# Patient Record
Sex: FEMALE | Race: WHITE | NOT HISPANIC OR LATINO | Employment: OTHER | RURAL
[De-identification: names, ages, dates, MRNs, and addresses within clinical notes are randomized per-mention and may not be internally consistent; named-entity substitution may affect disease eponyms.]

---

## 2022-12-13 ENCOUNTER — OFFICE VISIT (OUTPATIENT)
Dept: FAMILY MEDICINE | Facility: CLINIC | Age: 72
End: 2022-12-13
Payer: MEDICARE

## 2022-12-13 VITALS
WEIGHT: 125 LBS | HEIGHT: 63 IN | DIASTOLIC BLOOD PRESSURE: 79 MMHG | HEART RATE: 61 BPM | SYSTOLIC BLOOD PRESSURE: 160 MMHG | BODY MASS INDEX: 22.15 KG/M2

## 2022-12-13 DIAGNOSIS — Z23 IMMUNIZATION DUE: Primary | ICD-10-CM

## 2022-12-13 DIAGNOSIS — G25.71 DRUG INDUCED AKATHISIA: ICD-10-CM

## 2022-12-13 DIAGNOSIS — E03.9 HYPOTHYROIDISM, UNSPECIFIED TYPE: ICD-10-CM

## 2022-12-13 DIAGNOSIS — I10 ESSENTIAL HYPERTENSION: ICD-10-CM

## 2022-12-13 LAB
ALBUMIN SERPL BCP-MCNC: 4.6 G/DL (ref 3.5–5)
ALBUMIN/GLOB SERPL: 1.4 {RATIO}
ALP SERPL-CCNC: 73 U/L (ref 55–142)
ALT SERPL W P-5'-P-CCNC: 34 U/L (ref 13–56)
ANION GAP SERPL CALCULATED.3IONS-SCNC: 10 MMOL/L (ref 7–16)
AST SERPL W P-5'-P-CCNC: 35 U/L (ref 15–37)
BASOPHILS # BLD AUTO: 0.02 K/UL (ref 0–0.2)
BASOPHILS NFR BLD AUTO: 0.5 % (ref 0–1)
BILIRUB SERPL-MCNC: 0.6 MG/DL (ref ?–1.2)
BUN SERPL-MCNC: 11 MG/DL (ref 7–18)
BUN/CREAT SERPL: 14 (ref 6–20)
CALCIUM SERPL-MCNC: 9.5 MG/DL (ref 8.5–10.1)
CHLORIDE SERPL-SCNC: 104 MMOL/L (ref 98–107)
CO2 SERPL-SCNC: 29 MMOL/L (ref 21–32)
CREAT SERPL-MCNC: 0.81 MG/DL (ref 0.55–1.02)
DIFFERENTIAL METHOD BLD: ABNORMAL
EGFR (NO RACE VARIABLE) (RUSH/TITUS): 77 ML/MIN/1.73M²
EOSINOPHIL # BLD AUTO: 0.08 K/UL (ref 0–0.5)
EOSINOPHIL NFR BLD AUTO: 2.1 % (ref 1–4)
EOSINOPHIL NFR BLD MANUAL: 1 % (ref 1–4)
ERYTHROCYTE [DISTWIDTH] IN BLOOD BY AUTOMATED COUNT: 12.8 % (ref 11.5–14.5)
GLOBULIN SER-MCNC: 3.3 G/DL (ref 2–4)
GLUCOSE SERPL-MCNC: 84 MG/DL (ref 74–106)
HCT VFR BLD AUTO: 42.2 % (ref 38–47)
HGB BLD-MCNC: 13.6 G/DL (ref 12–16)
IMM GRANULOCYTES # BLD AUTO: 0.05 K/UL (ref 0–0.04)
IMM GRANULOCYTES NFR BLD: 1.3 % (ref 0–0.4)
LYMPHOCYTES # BLD AUTO: 2.66 K/UL (ref 1–4.8)
LYMPHOCYTES NFR BLD AUTO: 68.4 % (ref 27–41)
LYMPHOCYTES NFR BLD MANUAL: 62 % (ref 27–41)
MCH RBC QN AUTO: 31.3 PG (ref 27–31)
MCHC RBC AUTO-ENTMCNC: 32.2 G/DL (ref 32–36)
MCV RBC AUTO: 97.2 FL (ref 80–96)
MONOCYTES # BLD AUTO: 0.29 K/UL (ref 0–0.8)
MONOCYTES NFR BLD AUTO: 7.5 % (ref 2–6)
MONOCYTES NFR BLD MANUAL: 9 % (ref 2–6)
MPC BLD CALC-MCNC: 9.9 FL (ref 9.4–12.4)
NEUTROPHILS # BLD AUTO: 0.79 K/UL (ref 1.8–7.7)
NEUTROPHILS NFR BLD AUTO: 20.2 % (ref 53–65)
NEUTS BAND NFR BLD MANUAL: 4 % (ref 1–5)
NEUTS SEG NFR BLD MANUAL: 24 % (ref 50–62)
NRBC # BLD AUTO: 0 X10E3/UL
NRBC, AUTO (.00): 0 %
PLATELET # BLD AUTO: 353 K/UL (ref 150–400)
PLATELET MORPHOLOGY: NORMAL
POTASSIUM SERPL-SCNC: 4.1 MMOL/L (ref 3.5–5.1)
PROT SERPL-MCNC: 7.9 G/DL (ref 6.4–8.2)
RBC # BLD AUTO: 4.34 M/UL (ref 4.2–5.4)
RBC MORPH BLD: NORMAL
SODIUM SERPL-SCNC: 139 MMOL/L (ref 136–145)
TSH SERPL DL<=0.005 MIU/L-ACNC: 0.68 UIU/ML (ref 0.36–3.74)
VIT B12 SERPL-MCNC: 829 PG/ML (ref 193–986)
WBC # BLD AUTO: 3.89 K/UL (ref 4.5–11)

## 2022-12-13 PROCEDURE — 85025 COMPLETE CBC W/AUTO DIFF WBC: CPT | Mod: ,,, | Performed by: CLINICAL MEDICAL LABORATORY

## 2022-12-13 PROCEDURE — 82607 VITAMIN B12: ICD-10-PCS | Mod: ,,, | Performed by: CLINICAL MEDICAL LABORATORY

## 2022-12-13 PROCEDURE — 82607 VITAMIN B-12: CPT | Mod: ,,, | Performed by: CLINICAL MEDICAL LABORATORY

## 2022-12-13 PROCEDURE — 84443 ASSAY THYROID STIM HORMONE: CPT | Mod: ,,, | Performed by: CLINICAL MEDICAL LABORATORY

## 2022-12-13 PROCEDURE — 85025 CBC WITH DIFFERENTIAL: ICD-10-PCS | Mod: ,,, | Performed by: CLINICAL MEDICAL LABORATORY

## 2022-12-13 PROCEDURE — 84443 TSH: ICD-10-PCS | Mod: ,,, | Performed by: CLINICAL MEDICAL LABORATORY

## 2022-12-13 PROCEDURE — 99204 PR OFFICE/OUTPT VISIT, NEW, LEVL IV, 45-59 MIN: ICD-10-PCS | Mod: ,,, | Performed by: FAMILY MEDICINE

## 2022-12-13 PROCEDURE — 99204 OFFICE O/P NEW MOD 45 MIN: CPT | Mod: ,,, | Performed by: FAMILY MEDICINE

## 2022-12-13 PROCEDURE — G0008 FLU VACCINE - QUADRIVALENT - ADJUVANTED: ICD-10-PCS | Mod: ,,, | Performed by: FAMILY MEDICINE

## 2022-12-13 PROCEDURE — 90694 VACC AIIV4 NO PRSRV 0.5ML IM: CPT | Mod: ,,, | Performed by: FAMILY MEDICINE

## 2022-12-13 PROCEDURE — G0008 ADMIN INFLUENZA VIRUS VAC: HCPCS | Mod: ,,, | Performed by: FAMILY MEDICINE

## 2022-12-13 PROCEDURE — 80053 COMPREHENSIVE METABOLIC PANEL: ICD-10-PCS | Mod: ,,, | Performed by: CLINICAL MEDICAL LABORATORY

## 2022-12-13 PROCEDURE — 80053 COMPREHEN METABOLIC PANEL: CPT | Mod: ,,, | Performed by: CLINICAL MEDICAL LABORATORY

## 2022-12-13 PROCEDURE — 90694 FLU VACCINE - QUADRIVALENT - ADJUVANTED: ICD-10-PCS | Mod: ,,, | Performed by: FAMILY MEDICINE

## 2022-12-13 RX ORDER — CITALOPRAM 10 MG/1
10 TABLET ORAL DAILY
COMMUNITY
End: 2022-12-13 | Stop reason: SDUPTHER

## 2022-12-13 RX ORDER — METRONIDAZOLE 7.5 MG/G
CREAM TOPICAL DAILY
COMMUNITY
End: 2023-06-28

## 2022-12-13 RX ORDER — LISINOPRIL AND HYDROCHLOROTHIAZIDE 12.5; 2 MG/1; MG/1
1 TABLET ORAL DAILY
Qty: 90 TABLET | Refills: 0 | Status: CANCELLED | OUTPATIENT
Start: 2022-12-13

## 2022-12-13 RX ORDER — CITALOPRAM 10 MG/1
10 TABLET ORAL DAILY
Qty: 90 TABLET | Refills: 0 | Status: SHIPPED | OUTPATIENT
Start: 2022-12-13 | End: 2023-03-08

## 2022-12-13 RX ORDER — CARVEDILOL 3.12 MG/1
3.12 TABLET ORAL 2 TIMES DAILY
COMMUNITY
End: 2022-12-13 | Stop reason: SDUPTHER

## 2022-12-13 RX ORDER — LISINOPRIL AND HYDROCHLOROTHIAZIDE 12.5; 2 MG/1; MG/1
1 TABLET ORAL DAILY
COMMUNITY
End: 2022-12-13

## 2022-12-13 RX ORDER — CARVEDILOL 3.12 MG/1
3.12 TABLET ORAL 2 TIMES DAILY
Qty: 180 TABLET | Refills: 0 | Status: SHIPPED | OUTPATIENT
Start: 2022-12-13 | End: 2023-12-05

## 2022-12-13 RX ORDER — LOSARTAN POTASSIUM AND HYDROCHLOROTHIAZIDE 12.5; 1 MG/1; MG/1
1 TABLET ORAL DAILY
Qty: 90 TABLET | Refills: 3 | Status: SHIPPED | OUTPATIENT
Start: 2022-12-13 | End: 2023-12-05

## 2022-12-13 NOTE — PROGRESS NOTES
Star Galaviz IV, DO  [facility] [place]  [address], [place] [address]  Phone: [phone]      Subjective     Name: Casi Broussard   Sex: female  YOB: 1950 (72 y.o.)  MRN: [id number]  Visit Date: 12/13/2022   Chief Complaint: Establish [place]        HISTORY OF PRESENT ILLNESS:    Patient presents clinic today to establish care.  She has a medical history of hypertension, anxiety, and abnormal heart rhythm.  She has brought in a list of labs that her daughter thinks that she get.  Her daughter is a nurse practitioner have reviewed this list requests.  I agree with many things on the sheet and happy to order them.  In either Medicaid relevant I will be ordering today.  Baseline labs will include CBC, CMP, TSH, B12.  Patient is amenable to getting her influenza shot today.  Lastly patient systolic blood pressure was 160 in office today she is currently on lisinopril 20 hydrochlorothiazide 12.5 I will be changing this to losartan 100 with hydrochlorothiazide 12.5 today.  Patient return clinic in no more than 3 months for further evaluation.  All questions answered all concerns addressed.      This document was dictated using mmodal fluency direct.  It is subject to dictation errors.    PAST MEDICAL HISTORY:  Significant Diagnoses - Patient  has a past medical history of Anxiety, Depression, and Hypertension.  Medications - Patient has a current medication list which includes the following long-term medication(s): carvedilol, citalopram, losartan-hydrochlorothiazide 100-12.5 mg, and metronidazole 0.75%.   Allergies - Patient is allergic to codeine.  Surgeries - Patient  has no past surgical history on file.  Family History - Patient family history is not on file.      SOCIAL HISTORY:  Tobacco - Patient  reports that she has never smoked. She has never used smokeless tobacco.   Alcohol - Patient  reports current alcohol use.  "  Recreational Drugs - Patient  has no history on file for drug use.       Review of Systems   Constitutional:  Negative for fatigue and fever.   HENT:  Negative for nasal congestion and sore throat.    Respiratory:  Negative for cough and shortness of breath.    Cardiovascular:  Negative for chest pain.   Gastrointestinal:  Negative for abdominal pain, nausea and vomiting.   Genitourinary:  Negative for dysuria.   Musculoskeletal:  Negative for myalgias.   Integumentary:  Negative for rash.   Neurological:  Negative for dizziness, weakness and headaches.        Past Medical History:   Diagnosis Date    Anxiety     Depression     Hypertension         Review of patient's allergies indicates:   Allergen Reactions    Codeine         History reviewed. No pertinent surgical history.     History reviewed. No pertinent family history.    Current Outpatient Medications   Medication Instructions    carvediloL (COREG) 3.125 mg, Oral, 2 times daily    citalopram (CELEXA) 10 mg, Oral, Daily    losartan-hydrochlorothiazide 100-12.5 mg (HYZAAR) 100-12.5 mg Tab 1 tablet, Oral, Daily    metronidazole 0.75% (METROCREAM) 0.75 % Crea Topical (Top), Daily        Objective     BP (!) 160/79   Pulse 61   Ht 5' 3" (1.6 m)   Wt 56.7 kg (125 lb)   BMI 22.14 kg/m²     Physical Exam  Constitutional:       General: She is not in acute distress.     Appearance: Normal appearance. She is not ill-appearing.   HENT:      Head: Normocephalic and atraumatic.      Right Ear: External ear normal.      Left Ear: External ear normal.   Eyes:      Extraocular Movements: Extraocular movements intact.      Conjunctiva/sclera: Conjunctivae normal.   Cardiovascular:      Rate and Rhythm: Normal rate.      Heart sounds: No murmur heard.  Pulmonary:      Effort: Pulmonary effort is normal.   Musculoskeletal:      Cervical back: Normal range of motion.   Skin:     General: Skin is warm and dry.      Coloration: Skin is not jaundiced.      Findings: " No rash.   Neurological:      Mental Status: She is alert.   Psychiatric:         Mood and Affect: Mood normal.        All recently obtained labs have been reviewed and discussed in detail with the patient.   Assessment     1. Immunization due    2. Essential hypertension    3. Hypothyroidism, unspecified type    4. Drug induced akathisia         Plan        Problem List Items Addressed This Visit    None  Visit Diagnoses       Immunization due    -  Primary    Relevant Orders    Influenza (FLUAD) - Quadrivalent (Adjuvanted) *Preferred* (65+) (PF) (Completed)    Essential hypertension        Relevant Medications    carvediloL (COREG) 3.125 MG tablet    citalopram (CELEXA) 10 MG tablet    losartan-hydrochlorothiazide 100-12.5 mg (HYZAAR) 100-12.5 mg Tab    Other Relevant Orders    Comprehensive Metabolic Panel    CBC Auto Differential    Vitamin B12    Hypothyroidism, unspecified type        Relevant Orders    TSH    Drug induced akathisia        Relevant Orders    Vitamin B12            No follow-ups on file.    Patient advised that is symptoms worsen, they should call or report directly to local emergency department.    Star Galaviz,   [facility]

## 2023-01-09 DIAGNOSIS — Z71.89 COMPLEX CARE COORDINATION: ICD-10-CM

## 2023-01-26 ENCOUNTER — OFFICE VISIT (OUTPATIENT)
Dept: FAMILY MEDICINE | Facility: CLINIC | Age: 73
End: 2023-01-26
Payer: MEDICARE

## 2023-01-26 VITALS
DIASTOLIC BLOOD PRESSURE: 69 MMHG | WEIGHT: 125 LBS | HEIGHT: 63 IN | HEART RATE: 61 BPM | BODY MASS INDEX: 22.15 KG/M2 | SYSTOLIC BLOOD PRESSURE: 132 MMHG

## 2023-01-26 DIAGNOSIS — I10 ESSENTIAL HYPERTENSION: Chronic | ICD-10-CM

## 2023-01-26 DIAGNOSIS — M54.32 SCIATICA OF LEFT SIDE: Primary | ICD-10-CM

## 2023-01-26 DIAGNOSIS — M62.830 MUSCLE SPASM OF BACK: ICD-10-CM

## 2023-01-26 PROCEDURE — 96372 THER/PROPH/DIAG INJ SC/IM: CPT | Mod: ,,, | Performed by: FAMILY MEDICINE

## 2023-01-26 PROCEDURE — 99213 PR OFFICE/OUTPT VISIT, EST, LEVL III, 20-29 MIN: ICD-10-PCS | Mod: ,,, | Performed by: FAMILY MEDICINE

## 2023-01-26 PROCEDURE — 96372 PR INJECTION,THERAP/PROPH/DIAG2ST, IM OR SUBCUT: ICD-10-PCS | Mod: ,,, | Performed by: FAMILY MEDICINE

## 2023-01-26 PROCEDURE — 99213 OFFICE O/P EST LOW 20 MIN: CPT | Mod: ,,, | Performed by: FAMILY MEDICINE

## 2023-01-26 RX ORDER — PREDNISONE 20 MG/1
20 TABLET ORAL DAILY
Qty: 10 TABLET | Refills: 0 | Status: SHIPPED | OUTPATIENT
Start: 2023-01-26 | End: 2023-03-06

## 2023-01-26 RX ORDER — METHOCARBAMOL 500 MG/1
TABLET, FILM COATED ORAL
Qty: 40 TABLET | Refills: 0 | Status: SHIPPED | OUTPATIENT
Start: 2023-01-26 | End: 2023-06-28

## 2023-01-26 RX ORDER — KETOROLAC TROMETHAMINE 30 MG/ML
60 INJECTION, SOLUTION INTRAMUSCULAR; INTRAVENOUS
Status: COMPLETED | OUTPATIENT
Start: 2023-01-26 | End: 2023-01-26

## 2023-01-26 RX ADMIN — KETOROLAC TROMETHAMINE 60 MG: 30 INJECTION, SOLUTION INTRAMUSCULAR; INTRAVENOUS at 04:01

## 2023-01-26 NOTE — PROGRESS NOTES
Tio Davila MD   [facility]  [facility]  [address] [place] [address] [place] [address]  [phone]      PATIENT NAME: Casi Broussard  : 1950  DATE: 23  MRN: [id number]      Billing Provider: Tio Davila MD  Level of Service:   Patient PCP Information       Provider PCP Type    Tio Davila MD General            Reason for Visit / Chief Complaint: Back Pain       Update PCP  Update Chief Complaint         History of Present Illness / Problem Focused Workflow     Casi Broussard presents to the clinic with Back Pain       71 yo WF with 2 week history of LBP on left that radiates into LLE.  She denies any h/o back problems or sciatica.  Cannot identify any activity that may instigated her pain.  Denies any fall or injury.  She rates her pain as 8/10 and says that it is excruciating at times.  Is usually worse in mornings when she wakes up.  Has been taking tylenol and says that it does help some to reduce her pain.  Is made worse with certain movements.  Denies any numbness or tingling and does not report any bowel or bladder incontinence.      Review of Systems     Review of Systems   Constitutional:  Negative for chills and fever.   Musculoskeletal:  Positive for back pain, leg pain and myalgias.   Neurological:  Negative for weakness and numbness.      Medical / Social / Family History     Past Medical History:   Diagnosis Date    Anxiety     Depression     Hypertension        History reviewed. No pertinent surgical history.    Social History    reports that she has never smoked. She has never used smokeless tobacco. She reports current alcohol use.   Social History     Tobacco Use    Smoking status: Never    Smokeless tobacco: Never   Substance Use Topics    Alcohol use: Yes       Family History  History reviewed. No pertinent family history.    Medications and Allergies     Medications  Outpatient Medications Marked as Taking  for the 1/26/23 encounter (Office Visit) with Tio Davila MD   Medication Sig Dispense Refill    carvediloL (COREG) 3.125 MG tablet Take 1 tablet (3.125 mg total) by mouth 2 (two) times daily. 180 tablet 0    citalopram (CELEXA) 10 MG tablet Take 1 tablet (10 mg total) by mouth once daily. 90 tablet 0    losartan-hydrochlorothiazide 100-12.5 mg (HYZAAR) 100-12.5 mg Tab Take 1 tablet by mouth once daily. 90 tablet 3    metronidazole 0.75% (METROCREAM) 0.75 % Crea Apply topically once daily.       Current Facility-Administered Medications for the 1/26/23 encounter (Office Visit) with Tio Davila MD   Medication Dose Route Frequency Provider Last Rate Last Admin    [COMPLETED] ketorolac injection 60 mg  60 mg Intramuscular 1 time in Clinic/HOD Tio Davila MD   60 mg at 01/26/23 1651       Allergies  Review of patient's allergies indicates:   Allergen Reactions    Codeine        Physical Examination     Vitals:    01/26/23 1703   BP: 132/69   Pulse: 61     Physical Exam  Vitals reviewed.   Constitutional:       Appearance: Normal appearance.   HENT:      Head: Normocephalic and atraumatic.   Eyes:      Extraocular Movements: Extraocular movements intact.      Conjunctiva/sclera: Conjunctivae normal.      Pupils: Pupils are equal, round, and reactive to light.   Cardiovascular:      Rate and Rhythm: Normal rate and regular rhythm.      Heart sounds: Normal heart sounds.   Pulmonary:      Effort: Pulmonary effort is normal.      Breath sounds: Normal breath sounds.   Musculoskeletal:      Cervical back: Normal range of motion.      Comments: Decreased ROM lumbar spine; she has increased pain with flexion and rotation to left; pain unchanged with extension and rotation to right; she is able to toe walk and heel walk without difficulty   Skin:     General: Skin is warm and dry.   Neurological:      General: No focal deficit present.      Mental Status: She is alert and oriented to person, place, and time.       Motor: No weakness.      Comments: Mildly positive SLR on left   Psychiatric:         Mood and Affect: Mood normal.         Behavior: Behavior normal.        Assessment and Plan (including Health Maintenance)      Problem List  Smart Sets  Document Outside HM   :    Plan: as per handouts given to patient and discussed in clinic.  Take meds as prescribed.  RTC if no better in 1-2 weeks and will get xray at that time.  Initial BP mildly elevated but patient in pain.  Recheck BP normotensive.            Health Maintenance Due   Topic Date Due    Hepatitis C Screening  Never done    Lipid Panel  Never done    COVID-19 Vaccine (1) Never done    TETANUS VACCINE  Never done    Mammogram  Never done    DEXA Scan  Never done    Colorectal Cancer Screening  Never done    Shingles Vaccine (1 of 2) Never done       Problem List Items Addressed This Visit          Cardiac/Vascular    Essential hypertension     Other Visit Diagnoses       Sciatica of left side    -  Primary    Relevant Medications    ketorolac injection 60 mg (Completed)    predniSONE (DELTASONE) 20 MG tablet    methocarbamoL (ROBAXIN) 500 MG Tab    Muscle spasm of back        Relevant Medications    methocarbamoL (ROBAXIN) 500 MG Tab            The patient has no Health Maintenance topics of status Not Due    No future appointments.         Signature:  MD [facility]  [facility]  [address] [place] [address] [place] [address]  [phone]    Date of encounter: 1/26/23

## 2023-03-06 ENCOUNTER — OFFICE VISIT (OUTPATIENT)
Dept: FAMILY MEDICINE | Facility: CLINIC | Age: 73
End: 2023-03-06
Payer: MEDICARE

## 2023-03-06 ENCOUNTER — HOSPITAL ENCOUNTER (OUTPATIENT)
Dept: RADIOLOGY | Facility: HOSPITAL | Age: 73
Discharge: HOME | End: 2023-03-06
Attending: FAMILY MEDICINE
Payer: MEDICARE

## 2023-03-06 VITALS
HEART RATE: 68 BPM | BODY MASS INDEX: 22.32 KG/M2 | HEIGHT: 63 IN | SYSTOLIC BLOOD PRESSURE: 150 MMHG | DIASTOLIC BLOOD PRESSURE: 73 MMHG | WEIGHT: 126 LBS

## 2023-03-06 DIAGNOSIS — R41.3 MEMORY LOSS: Primary | ICD-10-CM

## 2023-03-06 DIAGNOSIS — I10 ESSENTIAL HYPERTENSION: ICD-10-CM

## 2023-03-06 DIAGNOSIS — R40.4 TRANSIENT ALTERATION OF AWARENESS: ICD-10-CM

## 2023-03-06 DIAGNOSIS — R41.3 OTHER AMNESIA: ICD-10-CM

## 2023-03-06 PROCEDURE — 99214 PR OFFICE/OUTPT VISIT, EST, LEVL IV, 30-39 MIN: ICD-10-PCS | Mod: ,,, | Performed by: FAMILY MEDICINE

## 2023-03-06 PROCEDURE — 70450 CT HEAD/BRAIN W/O DYE: CPT | Mod: TC

## 2023-03-06 PROCEDURE — 99214 OFFICE O/P EST MOD 30 MIN: CPT | Mod: ,,, | Performed by: FAMILY MEDICINE

## 2023-03-06 RX ORDER — MEMANTINE HYDROCHLORIDE 5 MG/1
5 TABLET ORAL 2 TIMES DAILY
Qty: 60 TABLET | Refills: 11 | Status: SHIPPED | OUTPATIENT
Start: 2023-03-06 | End: 2024-02-07

## 2023-03-06 RX ORDER — AMLODIPINE BESYLATE 5 MG/1
5 TABLET ORAL DAILY
Qty: 30 TABLET | Refills: 11 | Status: SHIPPED | OUTPATIENT
Start: 2023-03-06 | End: 2024-02-07

## 2023-03-06 NOTE — PROGRESS NOTES
"              Star Galaviz IV, DO  [facility] [place]  [address], [place] [address]  Phone: [phone]      Subjective     Name: Casi Broussard   Sex: female  YOB: 1950 (72 y.o.)  MRN: [id number]  Visit Date: 03/06/2023   Chief Complaint: Memory Loss (Pt is here with her sister who is an NP. Pt has memory loss that has worsened I the last year since moving back to [place])        HISTORY OF PRESENT ILLNESS:    Chief Complaint   Patient presents with    Memory Loss     Pt is here with her sister who is an NP. Pt has memory loss that has worsened I the last year since moving back to [place]       HPI  See tests that keep you healthy and the problem oriented documentation below.    Tests to Keep You Healthy    Mammogram: DUE  Colon Cancer Screening: DUE  Last Blood Pressure <= 139/89 (3/6/2023): NO        Portions of this note may have been created with voice recognition software. Occasional "wrong-word" or "sound-a-like" substitutions may have occurred due to the inherent limitations of voice recognition software. Please, read the note carefully and recognize, using context, where substitutions have occurred.     PAST MEDICAL HISTORY:  Significant Diagnoses - Patient  has a past medical history of Anxiety, Depression, and Hypertension.  Medications - Patient has a current medication list which includes the following long-term medication(s): carvedilol, citalopram, losartan-hydrochlorothiazide 100-12.5 mg, metronidazole 0.75%, amlodipine, and memantine.   Allergies - Patient is allergic to codeine.  Surgeries - Patient  has no past surgical history on file.  Family History - Patient family history is not on file.      SOCIAL HISTORY:  Tobacco - Patient  reports that she has never smoked. She has never used smokeless tobacco.   Alcohol - Patient  reports current alcohol use.   Recreational Drugs - Patient  has no history on file for drug use.       Review of Systems " "  Constitutional:  Negative for fatigue and fever.   HENT:  Negative for nasal congestion and sore throat.    Respiratory:  Negative for cough and shortness of breath.    Cardiovascular:  Negative for chest pain.   Gastrointestinal:  Negative for abdominal pain, nausea and vomiting.   Genitourinary:  Negative for dysuria.   Musculoskeletal:  Negative for myalgias.   Integumentary:  Negative for rash.   Neurological:  Negative for dizziness, weakness and headaches.        Past Medical History:   Diagnosis Date    Anxiety     Depression     Hypertension         Review of patient's allergies indicates:   Allergen Reactions    Codeine         No past surgical history on file.     No family history on file.    Current Outpatient Medications   Medication Instructions    amLODIPine (NORVASC) 5 mg, Oral, Daily    carvediloL (COREG) 3.125 mg, Oral, 2 times daily    citalopram (CELEXA) 10 mg, Oral, Daily    losartan-hydrochlorothiazide 100-12.5 mg (HYZAAR) 100-12.5 mg Tab 1 tablet, Oral, Daily    memantine (NAMENDA) 5 mg, Oral, 2 times daily    methocarbamoL (ROBAXIN) 500 MG Tab 1-2 tablets PO QID prn back spasm    metronidazole 0.75% (METROCREAM) 0.75 % Crea Topical (Top), Daily        Objective     BP (!) 150/73   Pulse 68   Ht 5' 3" (1.6 m)   Wt 57.2 kg (126 lb)   BMI 22.32 kg/m²     Physical Exam  Constitutional:       General: She is not in acute distress.     Appearance: Normal appearance. She is not ill-appearing.   HENT:      Head: Normocephalic and atraumatic.      Right Ear: External ear normal.      Left Ear: External ear normal.   Eyes:      Extraocular Movements: Extraocular movements intact.      Conjunctiva/sclera: Conjunctivae normal.   Cardiovascular:      Rate and Rhythm: Normal rate.      Heart sounds: No murmur heard.  Pulmonary:      Effort: Pulmonary effort is normal.   Musculoskeletal:      Cervical back: Normal range of motion.   Skin:     General: Skin is warm and dry.      Coloration: " Skin is not jaundiced.      Findings: No rash.   Neurological:      Mental Status: She is alert.   Psychiatric:         Mood and Affect: Mood normal.        All recently obtained labs have been reviewed and discussed in detail with the patient.   Assessment     1. Memory loss    2. Transient alteration of awareness    3. Other amnesia    4. Essential hypertension         Plan        Problem List Items Addressed This Visit          Neuro    Memory loss - Primary     Me cog score 5 today.  Patient has been having some trouble with short-term memory loss.  Long-term memory loss appears to be intact and my limited evaluation.  We will be starting Namenda 5 mg p.o. b.i.d. through medical decision-making and family preference         Relevant Medications    memantine (NAMENDA) 5 MG Tab    Transient alteration of awareness     Patient has transiently became whom less less aware of current situation.  We will be evaluating with CT without of the head.  Anticipate degenerative changes with microvascular disease.            Cardiac/Vascular    Essential hypertension     Patient is hypertensive in the office today at 150/73.  She is currently on Coreg 3.125 mg p.o. b.i.d. as well as losartan hydrochlorothiazide 100/12.5 mg p.o. q.d.  Patient would likely benefit from calcium channel blocker and the we will start Norvasc 5 mg p.o. q.d..  Patient was warned about possible side effects including leg swelling and monitor treatment.  If we need to we can always go up on this medication with the leg swelling is dose dependent.         Relevant Medications    amLODIPine (NORVASC) 5 MG tablet     Other Visit Diagnoses       Other amnesia        Relevant Orders    CT Head Without Contrast            No follow-ups on file.    Patient advised that is symptoms worsen, they should call or report directly to local emergency department.    Star Galaviz,   [facility]

## 2023-03-06 NOTE — ASSESSMENT & PLAN NOTE
Me cog score 5 today.  Patient has been having some trouble with short-term memory loss.  Long-term memory loss appears to be intact and my limited evaluation.  We will be starting Namenda 5 mg p.o. b.i.d. through medical decision-making and family preference

## 2023-03-06 NOTE — ASSESSMENT & PLAN NOTE
Patient is hypertensive in the office today at 150/73.  She is currently on Coreg 3.125 mg p.o. b.i.d. as well as losartan hydrochlorothiazide 100/12.5 mg p.o. q.d.  Patient would likely benefit from calcium channel blocker and the we will start Norvasc 5 mg p.o. q.d..  Patient was warned about possible side effects including leg swelling and monitor treatment.  If we need to we can always go up on this medication with the leg swelling is dose dependent.

## 2023-03-06 NOTE — ASSESSMENT & PLAN NOTE
Patient has transiently became whom less less aware of current situation.  We will be evaluating with CT without of the head.  Anticipate degenerative changes with microvascular disease.

## 2023-06-28 ENCOUNTER — APPOINTMENT (OUTPATIENT)
Dept: RADIOLOGY | Facility: CLINIC | Age: 73
End: 2023-06-28
Attending: FAMILY MEDICINE
Payer: MEDICARE

## 2023-06-28 ENCOUNTER — OFFICE VISIT (OUTPATIENT)
Dept: FAMILY MEDICINE | Facility: CLINIC | Age: 73
End: 2023-06-28
Payer: MEDICARE

## 2023-06-28 VITALS
WEIGHT: 126 LBS | SYSTOLIC BLOOD PRESSURE: 104 MMHG | HEART RATE: 77 BPM | DIASTOLIC BLOOD PRESSURE: 65 MMHG | BODY MASS INDEX: 22.32 KG/M2 | HEIGHT: 63 IN

## 2023-06-28 DIAGNOSIS — M79.672 LEFT FOOT PAIN: ICD-10-CM

## 2023-06-28 DIAGNOSIS — F03.90 DEMENTIA, UNSPECIFIED DEMENTIA SEVERITY, UNSPECIFIED DEMENTIA TYPE, UNSPECIFIED WHETHER BEHAVIORAL, PSYCHOTIC, OR MOOD DISTURBANCE OR ANXIETY: ICD-10-CM

## 2023-06-28 DIAGNOSIS — M79.672 LEFT FOOT PAIN: Primary | ICD-10-CM

## 2023-06-28 PROCEDURE — 99213 PR OFFICE/OUTPT VISIT, EST, LEVL III, 20-29 MIN: ICD-10-PCS | Mod: ,,, | Performed by: FAMILY MEDICINE

## 2023-06-28 PROCEDURE — 99213 OFFICE O/P EST LOW 20 MIN: CPT | Mod: ,,, | Performed by: FAMILY MEDICINE

## 2023-06-28 PROCEDURE — 73630 X-RAY EXAM OF FOOT: CPT | Mod: TC,RHCUB,FY,LT | Performed by: FAMILY MEDICINE

## 2023-06-28 RX ORDER — ASPIRIN 81 MG/1
81 TABLET ORAL DAILY
COMMUNITY

## 2023-07-03 PROBLEM — F03.90 DEMENTIA, UNSPECIFIED DEMENTIA SEVERITY, UNSPECIFIED DEMENTIA TYPE, UNSPECIFIED WHETHER BEHAVIORAL, PSYCHOTIC, OR MOOD DISTURBANCE OR ANXIETY: Status: ACTIVE | Noted: 2023-07-03

## 2023-07-03 NOTE — PROGRESS NOTES
"              Star Galaviz IV, DO  [facility] [place]  [address], [place] [address]  Phone: [phone]      Subjective     Name: Casi Broussard   Sex: female  YOB: 1950 (73 y.o.)  MRN: [id number]  Visit Date: 07/03/2023   Chief Complaint: left foot pain (X 2 weeks- pain, swelling/Saw dr in [place]- 6/20/23  xrays ok, uric acid level normal)        HISTORY OF PRESENT ILLNESS:    Chief Complaint   Patient presents with    left foot pain     X 2 weeks- pain, swelling  [facility]  xrays ok, uric acid level normal       HPI  See tests that keep you healthy and the problem oriented documentation below.    Tests to Keep You Healthy    Mammogram: DUE  Colon Cancer Screening: DUE  Last Blood Pressure <= 139/89 (6/28/2023): Yes      Portions of this note may have been created with voice recognition software. Occasional "wrong-word" or "sound-a-like" substitutions may have occurred due to the inherent limitations of voice recognition software. Please, read the note carefully and recognize, using context, where substitutions have occurred.     PAST MEDICAL HISTORY:  Significant Diagnoses - Patient  has a past medical history of Anxiety, Depression, and Hypertension.  Medications - Patient has a current medication list which includes the following long-term medication(s): amlodipine, aspirin, carvedilol, citalopram, losartan-hydrochlorothiazide 100-12.5 mg, and memantine.   Allergies - Patient is allergic to codeine.  Surgeries - Patient  has no past surgical history on file.  Family History - Patient family history is not on file.      SOCIAL HISTORY:  Tobacco - Patient  reports that she has never smoked. She has never used smokeless tobacco.   Alcohol - Patient  reports current alcohol use.   Recreational Drugs - Patient  has no history on file for drug use.       Review of Systems   All other systems reviewed and are negative.       Past Medical History:   Diagnosis " "Date    Anxiety     Depression     Hypertension         Review of patient's allergies indicates:   Allergen Reactions    Codeine         History reviewed. No pertinent surgical history.     History reviewed. No pertinent family history.    Current Outpatient Medications   Medication Instructions    amLODIPine (NORVASC) 5 mg, Oral, Daily    aspirin (ECOTRIN) 81 mg, Oral, Daily    carvediloL (COREG) 3.125 mg, Oral, 2 times daily    citalopram (CELEXA) 10 MG tablet Take 1 tablet by mouth once daily    losartan-hydrochlorothiazide 100-12.5 mg (HYZAAR) 100-12.5 mg Tab 1 tablet, Oral, Daily    memantine (NAMENDA) 5 mg, Oral, 2 times daily        Objective     /65   Pulse 77   Ht 5' 3" (1.6 m)   Wt 57.2 kg (126 lb)   BMI 22.32 kg/m²     Physical Exam  Constitutional:       General: She is not in acute distress.     Appearance: Normal appearance. She is not ill-appearing.   HENT:      Head: Normocephalic and atraumatic.      Right Ear: External ear normal.      Left Ear: External ear normal.   Eyes:      Extraocular Movements: Extraocular movements intact.      Conjunctiva/sclera: Conjunctivae normal.   Cardiovascular:      Rate and Rhythm: Normal rate.      Heart sounds: No murmur heard.  Pulmonary:      Effort: Pulmonary effort is normal.   Musculoskeletal:      Cervical back: Normal range of motion.   Skin:     General: Skin is warm and dry.      Coloration: Skin is not jaundiced.      Findings: No rash.   Neurological:      Mental Status: She is alert.   Psychiatric:         Mood and Affect: Mood normal.        All recently obtained labs have been reviewed and discussed in detail with the patient.   Assessment     1. Left foot pain    2. Dementia, unspecified dementia severity, unspecified dementia type, unspecified whether behavioral, psychotic, or mood disturbance or anxiety         Plan        Problem List Items Addressed This Visit          Other    Dementia, unspecified dementia severity, " unspecified dementia type, unspecified whether behavioral, psychotic, or mood disturbance or anxiety     Other Visit Diagnoses       Left foot pain    -  Primary    Relevant Orders    X-Ray Foot Complete 3 view Left (Completed)            No follow-ups on file.    Patient advised that is symptoms worsen, they should call or report directly to local emergency department.    Star Galaviz,   [facility]

## 2023-07-10 DIAGNOSIS — M79.672 LEFT FOOT PAIN: Primary | ICD-10-CM

## 2023-07-10 DIAGNOSIS — M25.572 LEFT ANKLE PAIN, UNSPECIFIED CHRONICITY: ICD-10-CM

## 2023-07-18 DIAGNOSIS — I10 ESSENTIAL HYPERTENSION: ICD-10-CM

## 2023-07-19 ENCOUNTER — CLINICAL SUPPORT (OUTPATIENT)
Dept: REHABILITATION | Facility: HOSPITAL | Age: 73
End: 2023-07-19
Payer: MEDICARE

## 2023-07-19 DIAGNOSIS — M25.572 LEFT ANKLE PAIN, UNSPECIFIED CHRONICITY: ICD-10-CM

## 2023-07-19 DIAGNOSIS — M79.672 LEFT FOOT PAIN: Primary | ICD-10-CM

## 2023-07-19 PROCEDURE — 97162 PT EVAL MOD COMPLEX 30 MIN: CPT

## 2023-07-19 PROCEDURE — 97035 APP MDLTY 1+ULTRASOUND EA 15: CPT

## 2023-07-19 RX ORDER — CITALOPRAM 10 MG/1
TABLET ORAL
Qty: 90 TABLET | Refills: 0 | Status: SHIPPED | OUTPATIENT
Start: 2023-07-19 | End: 2023-10-16

## 2023-07-19 NOTE — PLAN OF CARE
" [facility]  Physical Therapy Initial Evaluation    Name: Casi Broussard  Clinic Number: [id number]    Therapy Diagnosis:   Encounter Diagnoses   Name Primary?    Left foot pain Yes    Left ankle pain, unspecified chronicity      Physician: Star Galaviz IV, DO    Physician Orders: PT Eval and Treat  Medical Diagnosis from Referral: M79.672 (ICD-10-CM) - Left foot pain and M25.572 (ICD-10-CM) - Left ankle pain, unspecified chronicity  Evaluation Date: 7/19/2023  Authorization Period Expiration: 7/9/2024  Plan of Care Expiration: 8/25/2023  Visit # / Visits authorized: 1/9 (including initial evaluation)    Time In: 1110  Time Out: 1150  Total Appointment Time (timed & untimed codes): 40 minutes    Precautions: Standard    Subjective     Date of onset: ~3 weeks ago    History of current condition - Casi reports left dorsal foot pain that began ~3 weeks ago. Patient reports she does not know of a traumatic injury that caused the pain. Patient reports she has had 2 x-rays of her left foot, neither of which were conclusive on a source of pain. Patient reports intermittent pain in the left foot when she is not weightbearing and constant aching when walking.     Falls: none    Imaging: X-ray of left foot from 6/28/2023: "Mild-to-moderate hallux valgus deformity.  No acute fracture."    Prior Therapy: none for presenting condition  Social History: lives with their spouse  Steps/Stairs: 3 steps with a unilateral handrail when entering  Occupation: retired  Driving: No  Durable Medical Equipment: none  Dominant Extremity: right  Affected Extremity: left  Prior Level of Function: independent with all functional mobility without assistive device  Current Level of Function: independent with all functional mobility without assistive device but with a noted limp due to antalgic gait on the left    Pain:  Current 4/10, worst 8/10, best 0/10   Location: dorsal surface of left " foot  Description: Aching  Aggravating Factors: standing/walking  Easing Factors: pain medication, ice, and rest    Pts goals: Patient wishes to decrease the pain in her left foot    Medical History:   Past Medical History:   Diagnosis Date    Anxiety     Depression     Hypertension      Surgical History:   Casi Broussard  has no past surgical history on file.    Medications:   Casi has a current medication list which includes the following prescription(s): amlodipine, aspirin, carvedilol, citalopram, losartan-hydrochlorothiazide 100-12.5 mg, and memantine.    Allergies:   Review of patient's allergies indicates:   Allergen Reactions    Codeine      Objective     Range of motion:   Right Left    Knee extension WFL WFL   Knee flexion WFL WFL   Ankle dorsiflexion +3* +3*   Ankle plantarflexion 58* 40*   Ankle inversion 18* 20*   Ankle eversion 10* 20*     Manual muscle testing:   Right  Left    Knee extension  MMT strength: 5/5  MMT strength: 5/5   Knee flexion  MMT strength: 5/5  MMT strength: 5/5   Ankle dorsiflexion  MMT strength: 5/5  MMT strength: 4/5   Ankle plantarflexion  MMT strength: 5/5  MMT strength: 5/5   Ankle inversion  MMT strength: 5/5  MMT strength: 5/5   Ankle eversion  MMT strength: 5/5  MMT strength: 4-/5     Clinical Special Tests:  Anterior drawer test: right Negative left Negative  Inversion stress test: right Negative left Negative  Eversion stress test: right Negative left Negative    Incision: N/A    Gait:  Weight bearing precautions: WBAT  Assistive device: none  Ambulation distance and deviations: short community distances; decreased step lengths bilaterally; decreased heel strike bilaterally; antalgic gait on left with decreased left stance time    Limitation/Restriction for FOTO Intake Survey    Therapist reviewed FOTO scores for Casi Broussard on 7/19/2023.   FOTO documents entered into EPIC - see Media section.    Limitation Score: 33%       Treatment     Total Treatment time  "(time-based codes) separate from Evaluation: 8 minutes     Casi Broussard received the treatments listed below:      Direct contact modalities after being cleared for contraindications: Ultrasound:  Casi received ultrasound to manage pain and inflammation at 100% duty cycle applied to the dorsal surface of the left foot at an intensity of 1.0 W/cm2  for a duration of 8 minutes. Patient tolerated treatment well without adverse effects. Therapist was in attendance throughout intervention.    Patient Education and Home Exercises     Education provided: Patient educated on the importance of completing skilled physical therapy treatment and home exercise program as prescribed to maximize functional gains.     Written Home Exercises Provided: yes. Exercises were reviewed and Casi was able to demonstrate them prior to the end of the session. Casi demonstrated fair  understanding of the education provided.     See EMR under "Patient Instructions" for exercises provided during therapy sessions.    Assessment     Casi is a 73 y.o. female referred to outpatient physical therapy with a medical diagnosis of M79.672 (ICD-10-CM) - Left foot pain and M25.572 (ICD-10-CM) - Left ankle pain, unspecified chronicity. Pt presents to PT with left dorsal foot pain, pain with palpation and mobilization of the left metatarsals (specifically 2nd-4th), decreased left plantarflexion compared to the right, edema on the dorsal surface of the left foot, mild dorsiflexor and everter weakness on the left ankle/foot, and abnormal gait mechanics. If patient does not show progress with physical therapy within 2-3 weeks, Physical Therapist will recommend Dr. Galaviz to order an MRI of the left foot or refer patient to an orthopedic surgeon or podiatrist.    Pt prognosis is Good.   Pt will benefit from skilled outpatient Physical Therapy to address the deficits stated above and in the chart below, provide pt/family education, and to maximize " pt's level of independence.     Plan of care discussed with patient: Yes  Pt's spiritual, cultural and educational needs considered and pt agreeable to plan of care and goals as stated below:     Anticipated Barriers for therapy: compliance with home exercise program    Medical Necessity is demonstrated by the following:  History  Co-morbidities and personal factors that may impact the plan of care [] LOW: no personal factors / co-morbidities  [x] MODERATE: 1-2 personal factors / co-morbidities  [] HIGH: 3+ personal factors / co-morbidities    Moderate / High Support Documentation:   Co-morbidities affecting plan of care: N/A    Personal Factors:   age     Examination  Body Structures and Functions, activity limitations and participation restrictions that may impact the plan of care [] LOW: addressing 1-2 elements  [] MODERATE: 3+ elements  [x] HIGH: 4+ elements (please support below)    Moderate / High Support Documentation: pain, range of motion, strength, and gait mechanics     Clinical Presentation [x] LOW: stable  [] MODERATE: Evolving  [] HIGH: Unstable     Decision Making/ Complexity Score: moderate       Goals:    Short Term Goals:  Patient will demonstrate independence with home exercise program to ensure carryover of treatment.  Patient will improve left ankle plantarflexion active range of motion to 55 degrees to improve functional use of the left lower extremity.  Patient will improve left lower extremity strength to 4+/5 to improve independence and safety with bed mobility, transfers, and gait.  Patient will ambulate 150 feet without assistive device with independence with equal stance time and bilateral heel strike to improve independence and safety with gait.   Patient will report a reduction in left foot pain from 8/10 to 6/10 at worst to improve quality of life.     Long Term Goals:  Patient will improve left lower extremity strength to 5/5 to improve independence and safety with bed mobility,  transfers, and gait.  Patient will ambulate 300 feet without assistive device with independence with normal gait mechanics including up/down curbs and ramps to improve independence and safety with community ambulation.  Patient will report a reduction in left foot pain from 8/10 to 4/10 at worst to improve quality of life.     Plan     Plan of care Certification: 7/19/2023 to 8/25/2023.    Outpatient Physical Therapy 2 times weekly for 4 weeks to include the following interventions: Electrical Stimulation (premodulated IFC), Gait Training, Manual Therapy, Moist Heat/ Ice, Neuromuscular Re-ed, Patient Education, Therapeutic Activities, Therapeutic Exercise, and Ultrasound.       Will Pratt, PT, DPT  7/19/2023

## 2023-07-26 ENCOUNTER — CLINICAL SUPPORT (OUTPATIENT)
Dept: REHABILITATION | Facility: HOSPITAL | Age: 73
End: 2023-07-26
Attending: FAMILY MEDICINE
Payer: MEDICARE

## 2023-07-26 DIAGNOSIS — M79.672 LEFT FOOT PAIN: ICD-10-CM

## 2023-07-26 DIAGNOSIS — M25.572 LEFT ANKLE PAIN, UNSPECIFIED CHRONICITY: Primary | ICD-10-CM

## 2023-07-26 PROCEDURE — 97112 NEUROMUSCULAR REEDUCATION: CPT | Mod: CQ

## 2023-07-26 PROCEDURE — 97110 THERAPEUTIC EXERCISES: CPT | Mod: CQ

## 2023-07-26 PROCEDURE — 97035 APP MDLTY 1+ULTRASOUND EA 15: CPT | Mod: CQ

## 2023-07-26 NOTE — PROGRESS NOTES
[facility]  Physical Therapy Treatment Note    Name: Casi Broussard  Clinic Number: [id number]    Therapy Diagnosis:   Encounter Diagnoses   Name Primary?    Left ankle pain, unspecified chronicity Yes    Left foot pain      Physician: Star Galaviz IV, DO    Visit Date: 7/26/2023    Physician Orders: PT Eval and Treat  Medical Diagnosis from Referral: M79.672 (ICD-10-CM) - Left foot pain and M25.572 (ICD-10-CM) - Left ankle pain, unspecified chronicity  Evaluation Date: 7/19/2023  Authorization Period Expiration: 7/9/2024  Plan of Care Expiration: 8/25/2023  Visit # / Visits authorized: 2/9 (including initial evaluation)  PTA Visit #: 1    Time In: 1359  Time Out: 1446  Total Billable Time: 47 minutes    Precautions: Standard    Subjective     Pt reports: she is still having pain in her left foot that is worse when up on it.    She was compliant with home exercise program.  Response to previous treatment: first visit since initial evaluation    Pain: 5/10  Location: dorsal surface of left foot     Objective     Casi received therapeutic exercises to develop strength, endurance, ROM, and flexibility for 22 minutes including:    NuStep: x 5 minutes  Calf stretch on slant board: x 2 minutes  Towel scrunches: x 30 reps  Theraband ankle 4 way: plantar flexion/dorsiflexion/inversion/eversion: red theraband; x 20 reps  Ball massage: x 3 minutes    Casi received the following manual therapy techniques: were applied to the: left foot for 8 minutes, including:    Plantar flexor, dorsiflexor, invertors, and evertors passive range of motion stretching    Casi participated in neuromuscular re-education activities to improve: Balance for 9 minutes. The following activities were included:    [place] : x 3 minutes  Heel and toe raises: x 20 reps; least UE support required    Casi participated in gait training to improve functional mobility and safety for 0 minutes,  including:  Not performed    Casi received the following direct contact modalities after being cleared for contraindications: Ultrasound:  Casi received ultrasound to manage pain and inflammation at 100 % duty cycle applied to the dorsal of left foot at an intensity of 1.5 W/cm2 for a duration of 8 minutes. Patient tolerated treatment well without adverse effects. Therapist was in attendance throughout intervention.      Home Exercises Provided and Patient Education Provided     Education provided: Patient educated on the importance of completing skilled physical therapy treatment and home exercise program as prescribed to maximize functional gains.    Written Home Exercises Provided: Patient instructed to cont prior HEP. Exercises were reviewed and Casi was able to demonstrate them prior to the end of the session.  Casi demonstrated fair  understanding of the education provided.     See EMR under Patient Instructions for exercises provided  during therapy sessions .    Assessment     Patient able to perform all exercises with minimal complaint of increase in pain. Patient with difficulty picking up marbles with left foot during neuromuscular re-ed activity due to weakness. Patient with minimal complaint of pain during manual, particularly plantar flexion stretch. Patient stated she felt okay after treatment with no other complaints.    Casi isprogressing well towards her goals.   Pt prognosis is Good.     Pt will continue to benefit from skilled outpatient physical therapy to address the deficits listed in the problem list box on initial evaluation, provide pt/family education, and to maximize pt's level of independence in the home and community environment.     Pt's spiritual, cultural, and educational needs considered and pt agreeable to plan of care and goals.     Anticipated barriers to physical therapy: compliance with hep    Goals:    Short Term Goals:  Patient will demonstrate independence with  home exercise program to ensure carryover of treatment.  Patient will improve left ankle plantarflexion active range of motion to 55 degrees to improve functional use of the left lower extremity.  Patient will improve left lower extremity strength to 4+/5 to improve independence and safety with bed mobility, transfers, and gait.  Patient will ambulate 150 feet without assistive device with independence with equal stance time and bilateral heel strike to improve independence and safety with gait.   Patient will report a reduction in left foot pain from 8/10 to 6/10 at worst to improve quality of life.      Long Term Goals:  Patient will improve left lower extremity strength to 5/5 to improve independence and safety with bed mobility, transfers, and gait.  Patient will ambulate 300 feet without assistive device with independence with normal gait mechanics including up/down curbs and ramps to improve independence and safety with community ambulation.  Patient will report a reduction in left foot pain from 8/10 to 4/10 at worst to improve quality of life.     Plan     Patient will continue to benefit from skilled physical therapy treatment as prescribed working towards goals listed above to maximize functional potential.       Viktor Lea, RONAN  7/26/2023

## 2023-07-28 ENCOUNTER — CLINICAL SUPPORT (OUTPATIENT)
Dept: REHABILITATION | Facility: HOSPITAL | Age: 73
End: 2023-07-28
Attending: FAMILY MEDICINE
Payer: MEDICARE

## 2023-07-28 DIAGNOSIS — M79.672 LEFT FOOT PAIN: Primary | ICD-10-CM

## 2023-07-28 PROCEDURE — 97110 THERAPEUTIC EXERCISES: CPT

## 2023-07-28 PROCEDURE — 97112 NEUROMUSCULAR REEDUCATION: CPT

## 2023-07-28 PROCEDURE — 97010 HOT OR COLD PACKS THERAPY: CPT

## 2023-07-28 NOTE — PROGRESS NOTES
[facility]  Physical Therapy Treatment Note    Name: Casi Broussard  Clinic Number: [id number]    Therapy Diagnosis:   Encounter Diagnoses   Name Primary?    Left foot pain Yes    Left ankle pain, unspecified chronicity      Physician: Star Galaviz IV, DO    Visit Date: 7/28/2023    Physician Orders: PT Eval and Treat  Medical Diagnosis from Referral: M79.672 (ICD-10-CM) - Left foot pain and M25.572 (ICD-10-CM) - Left ankle pain, unspecified chronicity  Evaluation Date: 7/19/2023  Authorization Period Expiration: 7/9/2024  Plan of Care Expiration: 8/25/2023  Visit # / Visits authorized: 3/9 (including initial evaluation)  PTA Visit #: 0    Time In: 1100  Time Out: 1145  Total Billable Time: 45 minutes    Precautions: Standard    Subjective     Pt reports: Her left foot is bothering her some; however, she feels like her pain is improving.     She was compliant with home exercise program.  Response to previous treatment: first visit since initial evaluation    Pain: 3/10  Location: dorsal surface of left foot     Objective     Casi received therapeutic exercises to develop strength, endurance, ROM, and flexibility for 22 minutes including:    NuStep: x 5 minutes  Calf stretch on slant board: x 2 minutes  Towel scrunches: x 30 reps  Resisted plantar flexion/dorsiflexion/inversion/eversion (left): red theraband; x 20 reps each direction (verbal/tactile cues for proper completion of inversion)    Ball massage: x 3 minutes (not performed)     Casi received the following manual therapy techniques: were applied to the: left foot for 5 minutes, including:    Manual stretching of the left ankle/foot into dorsiflexion, plantarflexion, inversion, and eversion  Joint mobilizations of the tarsometatarsal and metatarsophalangeal joints    Casi participated in neuromuscular re-education activities to improve: Balance for 10 minutes. The following activities were included:    [place]   (left): x 20 reps  Heel/toe raises: x 20 reps with least required upper extremity support    Casi participated in gait training to improve functional mobility and safety for 0 minutes, including:    (not performed)    Casi received the following direct contact modalities after being cleared for contraindications: Ultrasound: Casi received ultrasound to manage pain and inflammation at 50 % duty cycle applied to the dorsal surface of the left foot at an intensity of 1.0 W/cm2 for a duration of 8 minutes. Patient tolerated treatment well without adverse effects. Therapist was in attendance throughout intervention.    Home Exercises Provided and Patient Education Provided     Education provided: Patient educated on the importance of completing skilled physical therapy treatment and home exercise program as prescribed to maximize functional gains.    Written Home Exercises Provided: Patient instructed to cont prior HEP. Exercises were reviewed and Casi was able to demonstrate them prior to the end of the session.  Casi demonstrated fair  understanding of the education provided.     See EMR under Patient Instructions for exercises provided  during therapy sessions .    Assessment     Patient with good effort throughout treatment. Patient able to  marbles with toes of left foot with little to no difficulty today. Physical Therapist will continue to progress therapeutic exercise, neuromuscular re-education, therapeutic activities, and gait training as able with manual therapy and modalities utilized as needed.    Casi isprogressing well towards her goals.   Pt prognosis is Good.     Pt will continue to benefit from skilled outpatient physical therapy to address the deficits listed in the problem list box on initial evaluation, provide pt/family education, and to maximize pt's level of independence in the home and community environment.     Pt's spiritual, cultural, and educational needs considered  and pt agreeable to plan of care and goals.     Anticipated barriers to physical therapy: compliance with hep    Goals:    Short Term Goals:  Patient will demonstrate independence with home exercise program to ensure carryover of treatment.  Patient will improve left ankle plantarflexion active range of motion to 55 degrees to improve functional use of the left lower extremity.  Patient will improve left lower extremity strength to 4+/5 to improve independence and safety with bed mobility, transfers, and gait.  Patient will ambulate 150 feet without assistive device with independence with equal stance time and bilateral heel strike to improve independence and safety with gait.   Patient will report a reduction in left foot pain from 8/10 to 6/10 at worst to improve quality of life.      Long Term Goals:  Patient will improve left lower extremity strength to 5/5 to improve independence and safety with bed mobility, transfers, and gait.  Patient will ambulate 300 feet without assistive device with independence with normal gait mechanics including up/down curbs and ramps to improve independence and safety with community ambulation.  Patient will report a reduction in left foot pain from 8/10 to 4/10 at worst to improve quality of life.     Plan     Patient will continue to benefit from skilled physical therapy treatment as prescribed working towards goals listed above to maximize functional potential.       Will Pratt, PT, DPT  7/28/2023

## 2023-08-02 ENCOUNTER — CLINICAL SUPPORT (OUTPATIENT)
Dept: REHABILITATION | Facility: HOSPITAL | Age: 73
End: 2023-08-02
Attending: FAMILY MEDICINE
Payer: MEDICARE

## 2023-08-02 DIAGNOSIS — M79.672 LEFT FOOT PAIN: Primary | ICD-10-CM

## 2023-08-02 PROCEDURE — 97110 THERAPEUTIC EXERCISES: CPT

## 2023-08-02 PROCEDURE — 97010 HOT OR COLD PACKS THERAPY: CPT

## 2023-08-02 NOTE — PROGRESS NOTES
[facility]  Physical Therapy Treatment Note    Name: Casi Broussard  Clinic Number: [id number]    Therapy Diagnosis:   Encounter Diagnoses   Name Primary?    Left foot pain Yes    Left ankle pain, unspecified chronicity      Physician: Star Galaviz IV, DO    Visit Date: 8/2/2023    Physician Orders: PT Eval and Treat  Medical Diagnosis from Referral: M79.672 (ICD-10-CM) - Left foot pain and M25.572 (ICD-10-CM) - Left ankle pain, unspecified chronicity  Evaluation Date: 7/19/2023  Authorization Period Expiration: 7/9/2024  Plan of Care Expiration: 8/25/2023  Visit # / Visits authorized: 4/9 (including initial evaluation)  PTA Visit #: 0    Time In: 1319  Time Out: 1400  Total Billable Time: 41 minutes    Precautions: Standard    Subjective     Pt reports: Her left foot is bothering her worse today than it had been. Patient reports she was doing some better, so she began to walk more than she was previously.     She was compliant with home exercise program.  Response to previous treatment: first visit since initial evaluation    Pain: 3/10  Location: dorsal surface of left foot     Objective     Casi received therapeutic exercises to develop strength, endurance, ROM, and flexibility for 24 minutes including:    NuStep: x 5 minutes  Calf stretch on slant board: x 2 minutes  Towel scrunches: x 30 reps  Resisted plantar flexion/dorsiflexion/inversion/eversion (left): red theraband; x 20 reps each direction (verbal/tactile cues for proper completion of inversion)    Ball massage: x 3 minutes (not performed)     Casi received the following manual therapy techniques: were applied to the: left foot for 0 minutes, including:    Manual stretching of the left ankle/foot into dorsiflexion, plantarflexion, inversion, and eversion (not performed)   Joint mobilizations of the tarsometatarsal and metatarsophalangeal joints (not performed)     Casi participated in neuromuscular  re-education activities to improve: Balance for 7 minutes. The following activities were included:    [place]  (left): (attempted but unable)  Heel/toe raises: x 20 reps with least required upper extremity support    Casi participated in gait training to improve functional mobility and safety for 0 minutes, including:    (not performed)    Casi received the following direct contact modalities after being cleared for contraindications: Ultrasound: Casi received ultrasound to manage pain and inflammation at 50 % duty cycle applied to the dorsal surface of the left foot at an intensity of 1.0 W/cm2 for a duration of 0 minutes. Patient tolerated treatment well without adverse effects. Therapist was in attendance throughout intervention.    Casi received cold pack for 10 minutes to the left foot elevated on a wedge.    Home Exercises Provided and Patient Education Provided     Education provided: Patient educated on the importance of completing skilled physical therapy treatment and home exercise program as prescribed to maximize functional gains.    Written Home Exercises Provided: Patient instructed to cont prior HEP. Exercises were reviewed and Casi was able to demonstrate them prior to the end of the session.  Casi demonstrated fair  understanding of the education provided.     See EMR under Patient Instructions for exercises provided  during therapy sessions .    Assessment     Patient with good effort throughout treatment. Patient continues to have edema on the dorsal aspect of her left foot over the metatarsals, more concentrated medially. Patient with decreased ability to perform some exercises this treatment, including marble pick ups with toes, compared to previous treatments. Patient reports she continues to have pain in her left foot daily. Physical Therapist recommended to patient that she complete 1 more week of physical therapy treatment (2 sessions) and make a determination following  that about continuation of care vs discharge with recommendations to Dr. Galaviz to order an MRI of patient's left foot and/or refer patient to an orthopedic surgeon vs podiatrist. Patient and patient's  agreed with the plan. Physical Therapist will continue to progress therapeutic exercise, neuromuscular re-education, therapeutic activities, and gait training as able with manual therapy and modalities utilized as needed.    Casi isprogressing well towards her goals.   Pt prognosis is Good.     Pt will continue to benefit from skilled outpatient physical therapy to address the deficits listed in the problem list box on initial evaluation, provide pt/family education, and to maximize pt's level of independence in the home and community environment.     Pt's spiritual, cultural, and educational needs considered and pt agreeable to plan of care and goals.     Anticipated barriers to physical therapy: compliance with hep    Goals:    Short Term Goals:  Patient will demonstrate independence with home exercise program to ensure carryover of treatment.  Patient will improve left ankle plantarflexion active range of motion to 55 degrees to improve functional use of the left lower extremity.  Patient will improve left lower extremity strength to 4+/5 to improve independence and safety with bed mobility, transfers, and gait.  Patient will ambulate 150 feet without assistive device with independence with equal stance time and bilateral heel strike to improve independence and safety with gait.   Patient will report a reduction in left foot pain from 8/10 to 6/10 at worst to improve quality of life.      Long Term Goals:  Patient will improve left lower extremity strength to 5/5 to improve independence and safety with bed mobility, transfers, and gait.  Patient will ambulate 300 feet without assistive device with independence with normal gait mechanics including up/down curbs and ramps to improve independence and safety  with community ambulation.  Patient will report a reduction in left foot pain from 8/10 to 4/10 at worst to improve quality of life.     Plan     Patient will continue to benefit from skilled physical therapy treatment as prescribed working towards goals listed above to maximize functional potential.       Will rPatt, PT, DPT  8/2/2023

## 2023-08-04 ENCOUNTER — CLINICAL SUPPORT (OUTPATIENT)
Dept: REHABILITATION | Facility: HOSPITAL | Age: 73
End: 2023-08-04
Attending: FAMILY MEDICINE
Payer: MEDICARE

## 2023-08-04 DIAGNOSIS — M79.672 LEFT FOOT PAIN: Primary | ICD-10-CM

## 2023-08-04 PROCEDURE — 97110 THERAPEUTIC EXERCISES: CPT | Mod: CQ

## 2023-08-04 PROCEDURE — 97112 NEUROMUSCULAR REEDUCATION: CPT | Mod: CQ

## 2023-08-04 NOTE — PROGRESS NOTES
[facility]  Physical Therapy Treatment Note    Name: Casi Broussard  Clinic Number: [id number]    Therapy Diagnosis:   Encounter Diagnoses   Name Primary?    Left foot pain Yes    Left ankle pain, unspecified chronicity      Physician: Star Galaviz IV, DO    Visit Date: 8/4/2023    Physician Orders: PT Eval and Treat  Medical Diagnosis from Referral: M79.672 (ICD-10-CM) - Left foot pain and M25.572 (ICD-10-CM) - Left ankle pain, unspecified chronicity  Evaluation Date: 7/19/2023  Authorization Period Expiration: 7/9/2024  Plan of Care Expiration: 8/25/2023  Visit # / Visits authorized: 5/9 (including initial evaluation)  PTA Visit #: 1    Time In: 1100  Time Out: 1145  Total Billable Time: 45 minutes    Precautions: Standard    Subjective     Pt reports: She feels her pain mostly with walking today.     She was compliant with home exercise program.  Response to previous treatment: first visit since initial evaluation    Pain: 3/10  Location: dorsal surface of left foot     Objective     Casi received therapeutic exercises to develop strength, endurance, ROM, and flexibility for 27 minutes including:  NuStep: x 5 minutes  Calf stretch on slant board: x 2 minutes  Towel scrunches: x 30 reps  Resisted plantar flexion/dorsiflexion/inversion/eversion (left): red theraband; x 20 reps each direction (verbal/tactile cues for proper completion of inversion)    Casi received the following manual therapy techniques: were applied to the: left foot for 0 minutes, including:  Manual stretching of the left ankle/foot into dorsiflexion, plantarflexion, inversion, and eversion (not performed)   Joint mobilizations of the tarsometatarsal and metatarsophalangeal joints (not performed)     Casi participated in neuromuscular re-education activities to improve: Balance for 8 minutes. The following activities were included:  [place]  (left): x 4 minutes   Heel/toe raises: x 30 reps  with least required upper extremity support    Casi participated in gait training to improve functional mobility and safety for 0 minutes, including:  (not performed)    Casi received the following direct contact modalities after being cleared for contraindications: Ultrasound: Casi received ultrasound to manage pain and inflammation at 50 % duty cycle applied to the dorsal surface of the left foot at an intensity of 1.0 W/cm2 for a duration of 0 minutes. Patient tolerated treatment well without adverse effects. Therapist was in attendance throughout intervention.    Casi received cold pack for 10 minutes to the left foot elevated on a wedge.    Home Exercises Provided and Patient Education Provided     Education provided: Patient educated on the importance of completing skilled physical therapy treatment and home exercise program as prescribed to maximize functional gains.    Written Home Exercises Provided: Patient instructed to cont prior HEP. Exercises were reviewed and Casi was able to demonstrate them prior to the end of the session.  Casi demonstrated fair  understanding of the education provided.     See EMR under Patient Instructions for exercises provided  during therapy sessions .    Assessment     Patient with good effort and with no new complaints following treatment.     Casi isprogressing well towards her goals.   Pt prognosis is Good.     Pt will continue to benefit from skilled outpatient physical therapy to address the deficits listed in the problem list box on initial evaluation, provide pt/family education, and to maximize pt's level of independence in the home and community environment.     Pt's spiritual, cultural, and educational needs considered and pt agreeable to plan of care and goals.     Anticipated barriers to physical therapy: compliance with hep    Goals:    Short Term Goals:  Patient will demonstrate independence with home exercise program to ensure carryover of  treatment.  Patient will improve left ankle plantarflexion active range of motion to 55 degrees to improve functional use of the left lower extremity.  Patient will improve left lower extremity strength to 4+/5 to improve independence and safety with bed mobility, transfers, and gait.  Patient will ambulate 150 feet without assistive device with independence with equal stance time and bilateral heel strike to improve independence and safety with gait.   Patient will report a reduction in left foot pain from 8/10 to 6/10 at worst to improve quality of life.      Long Term Goals:  Patient will improve left lower extremity strength to 5/5 to improve independence and safety with bed mobility, transfers, and gait.  Patient will ambulate 300 feet without assistive device with independence with normal gait mechanics including up/down curbs and ramps to improve independence and safety with community ambulation.  Patient will report a reduction in left foot pain from 8/10 to 4/10 at worst to improve quality of life.     Plan     Patient will continue to benefit from skilled physical therapy treatment as prescribed working towards goals listed above to maximize functional potential.       MARY CARMEN Zimmerman  8/4/2023

## 2023-08-09 ENCOUNTER — CLINICAL SUPPORT (OUTPATIENT)
Dept: REHABILITATION | Facility: HOSPITAL | Age: 73
End: 2023-08-09
Attending: FAMILY MEDICINE
Payer: MEDICARE

## 2023-08-09 DIAGNOSIS — Z71.89 COMPLEX CARE COORDINATION: ICD-10-CM

## 2023-08-09 DIAGNOSIS — M79.672 LEFT FOOT PAIN: Primary | ICD-10-CM

## 2023-08-09 PROCEDURE — 97112 NEUROMUSCULAR REEDUCATION: CPT | Mod: CQ

## 2023-08-09 PROCEDURE — 97110 THERAPEUTIC EXERCISES: CPT | Mod: CQ

## 2023-08-11 ENCOUNTER — CLINICAL SUPPORT (OUTPATIENT)
Dept: REHABILITATION | Facility: HOSPITAL | Age: 73
End: 2023-08-11
Attending: FAMILY MEDICINE
Payer: MEDICARE

## 2023-08-11 DIAGNOSIS — M79.672 LEFT FOOT PAIN: Primary | ICD-10-CM

## 2023-08-11 PROCEDURE — 97112 NEUROMUSCULAR REEDUCATION: CPT

## 2023-08-11 PROCEDURE — 97110 THERAPEUTIC EXERCISES: CPT

## 2023-08-11 PROCEDURE — 97010 HOT OR COLD PACKS THERAPY: CPT

## 2023-08-11 NOTE — PLAN OF CARE
[facility]  Physical Therapy Discharge Summary    Name: Casi Broussard  Clinic Number: [id number]    Therapy Diagnosis:   Encounter Diagnoses   Name Primary?    Left foot pain Yes    Left ankle pain, unspecified chronicity      Physician: Star Galaviz IV, DO    Visit Date: 8/11/2023    Physician Orders: PT Eval and Treat  Medical Diagnosis from Referral: M79.672 (ICD-10-CM) - Left foot pain and M25.572 (ICD-10-CM) - Left ankle pain, unspecified chronicity  Evaluation Date: 7/19/2023  Authorization Period Expiration: 7/9/2024  Plan of Care Expiration: 8/25/2023  Visit # / Visits authorized: 7/9 (including initial evaluation)  PTA Visit #: 0    Time In: 1100  Time Out: 1145  Total Billable Time: 45 minutes    Precautions: Standard    Subjective     Pt reports: She has not noticed a significant reduction in her left foot pain since beginning physical therapy treatment.     She was compliant with home exercise program.  Response to previous treatment: first visit since initial evaluation    Pain: 4/10  Location: dorsal surface of left foot     Objective     Casi received therapeutic exercises to develop strength, endurance, ROM, and flexibility for 13 minutes including:    NuStep: x 5 minutes  Calf stretch on slant board: x 2 minutes  Resisted plantar flexion/dorsiflexion/eversion (left): red theraband; x 20 reps each direction (no inversion this treatment due to pain)    Casi participated in neuromuscular re-education activities to improve: Balance for 12 minutes. The following activities were included:    Heel/toe raises: x 20 reps with least required upper extremity support  Beam forward/backwards/lateral: x 3 laps each direction    Casi received cold pack for 10 minutes to the left foot elevated on a wedge.    Home Exercises Provided and Patient Education Provided     Education provided: Patient educated on the importance of continuing completion of home exercise  program 3-4 times/week for stretching/strength maintenance.     Written Home Exercises Provided: Patient instructed to cont prior HEP. Exercises were reviewed and Casi was able to demonstrate them prior to the end of the session.  Casi demonstrated fair  understanding of the education provided.     See EMR under Patient Instructions for exercises provided during therapy sessions.    Assessment     Patient with very little progress made and continued pain on the dorsal surface of her left foot. Patient's quality of life has decreased in response to her pain due to having to stop frequently to elevate/ice her left foot. Patient would benefit from further imaging of the left foot via MRI and/or a referral to orthopedics/podiatry for further evaluation of the left foot. Patient has reached the maximum benefit from skilled physical therapy treatment at the present time and will be discharged with a home exercise program.     Pt's spiritual, cultural, and educational needs considered and pt agreeable to plan of care and goals.     Anticipated barriers to physical therapy: compliance with hep    Goals:    Short Term Goals:  Patient will demonstrate independence with home exercise program to ensure carryover of treatment. [Met]  Patient will improve left ankle plantarflexion active range of motion to 55 degrees to improve functional use of the left lower extremity. [Not met]  Patient will improve left lower extremity strength to 4+/5 to improve independence and safety with bed mobility, transfers, and gait. [Not met]  Patient will ambulate 150 feet without assistive device with independence with equal stance time and bilateral heel strike to improve independence and safety with gait. [Not met]  Patient will report a reduction in left foot pain from 8/10 to 6/10 at worst to improve quality of life. [Not met]     Long Term Goals:  Patient will improve left lower extremity strength to 5/5 to improve independence and safety  with bed mobility, transfers, and gait. [Not met]  Patient will ambulate 300 feet without assistive device with independence with normal gait mechanics including up/down curbs and ramps to improve independence and safety with community ambulation. [Not met]  Patient will report a reduction in left foot pain from 8/10 to 4/10 at worst to improve quality of life. [Not met]    Discharge reason: Patient has reached the maximum rehab potential for the present time    Plan     This patient is discharged from Physical Therapy.      Will Pratt, PT, DPT  8/11/2023

## 2023-08-22 ENCOUNTER — OFFICE VISIT (OUTPATIENT)
Dept: DERMATOLOGY | Facility: CLINIC | Age: 73
End: 2023-08-22
Payer: MEDICARE

## 2023-08-22 DIAGNOSIS — L21.9 SEBORRHEIC DERMATITIS: ICD-10-CM

## 2023-08-22 DIAGNOSIS — B35.1 ONYCHOMYCOSIS: ICD-10-CM

## 2023-08-22 DIAGNOSIS — L82.1 SEBORRHEIC KERATOSES: ICD-10-CM

## 2023-08-22 DIAGNOSIS — L57.0 ACTINIC KERATOSES: Primary | ICD-10-CM

## 2023-08-22 PROCEDURE — 99204 OFFICE O/P NEW MOD 45 MIN: CPT | Mod: 25,,, | Performed by: STUDENT IN AN ORGANIZED HEALTH CARE EDUCATION/TRAINING PROGRAM

## 2023-08-22 PROCEDURE — 88312 SPECIAL STAINS GROUP 1: CPT | Mod: 26,,, | Performed by: PATHOLOGY

## 2023-08-22 PROCEDURE — 88304 PATHOLOGY, DERMATOLOGY: ICD-10-PCS | Mod: 26,,, | Performed by: PATHOLOGY

## 2023-08-22 PROCEDURE — 99204 PR OFFICE/OUTPT VISIT, NEW, LEVL IV, 45-59 MIN: ICD-10-PCS | Mod: 25,,, | Performed by: STUDENT IN AN ORGANIZED HEALTH CARE EDUCATION/TRAINING PROGRAM

## 2023-08-22 PROCEDURE — 17000 DESTRUCT PREMALG LESION: CPT | Mod: ,,, | Performed by: STUDENT IN AN ORGANIZED HEALTH CARE EDUCATION/TRAINING PROGRAM

## 2023-08-22 PROCEDURE — 88312 PATHOLOGY, DERMATOLOGY: ICD-10-PCS | Mod: 26,,, | Performed by: PATHOLOGY

## 2023-08-22 PROCEDURE — 87220 TISSUE EXAM FOR FUNGI: CPT | Mod: ,,, | Performed by: PATHOLOGY

## 2023-08-22 PROCEDURE — 88304 TISSUE EXAM BY PATHOLOGIST: CPT | Mod: 26,,, | Performed by: PATHOLOGY

## 2023-08-22 PROCEDURE — 17000 PR DESTRUCTION(LASER SURGERY,CRYOSURGERY,CHEMOSURGERY),PREMALIGNANT LESIONS,FIRST LESION: ICD-10-PCS | Mod: ,,, | Performed by: STUDENT IN AN ORGANIZED HEALTH CARE EDUCATION/TRAINING PROGRAM

## 2023-08-22 PROCEDURE — 87220 TISSUE EXAM FOR FUNGI: CPT | Mod: TC,SUR | Performed by: STUDENT IN AN ORGANIZED HEALTH CARE EDUCATION/TRAINING PROGRAM

## 2023-08-22 PROCEDURE — 87220 PATHOLOGY, DERMATOLOGY: ICD-10-PCS | Mod: ,,, | Performed by: PATHOLOGY

## 2023-08-22 RX ORDER — FLUOCINONIDE TOPICAL SOLUTION USP, 0.05% 0.5 MG/ML
SOLUTION TOPICAL 2 TIMES DAILY
Qty: 60 ML | Refills: 5 | Status: SHIPPED | OUTPATIENT
Start: 2023-08-22 | End: 2024-02-12

## 2023-08-22 NOTE — PROGRESS NOTES
[facility]  Josef Bonilla MD    [address] [address]  [phone]    Fax: [phone]    Patient Name: Casi Broussard  Medical Record Number: [id number]  PCP: Star Galaviz IV, DO  Age: 73 y.o. : 1950  Contact: [phone] (home)     CC: pruritus  History of Present Illness:     Casi Broussard is a 73 y.o.  female with no history of skin cancer who presents to clinic today for pruritus on the upper back. Patient also complains about pruritus and flaking in the bilateral ears and pruritus in the scalp.      The patient has no other concerns today.    Review of Systems:     Unremarkable other than mentioned above.     Physical Exam:     General: Relaxed, oriented, alert    Skin examination of the scalp, face, neck, chest, back, abdomen, upper extremities and lower extremities were normal except for as listed below      Assessment and Plan:     1. Seborrheic Dermatitis   - Red scaly plaques located on the scalp, nasolabial folds, and eyebrows    Plan:   - lidex solution bid PRN - ears and scalp       2. Onychomycosis (B35.1)  - discolored nails with onycholysis and subungual debris    Plan: onychodystrophy   Nail clipping today   If positive, will do pulse dose lamisil (250 mg daily for 7 days a month x 12 months)     Lamisil (terbinafine) Counseling: Patient counseling regarding adverse effects of lamisil including but not limited to headache, diarrhea, rash, upset stomach, liver function test abnormalities, itching, taste/smell disturbance, nausea, abdominal pain, and flatulence. There is a rare possibility of liver failure that can occur when taking lamisil. The patient understands that a baseline LFT and kidney function test may be required. The patient verbalized understanding of the proper use and possible adverse effects of lamisil. All of the patient's questions and concerns were addressed.      3. Seborrheic keratoses   - brown stuck on  appearing papules/plaques  - patient educated on benign nature. No treatment necessary unless they become irritated or inflamed     4. Actinic Keratoses  Erythematous, scaly papules on right hand,     Plan: Liquid Nitrogen.  A total of 1 lesions were treated with liquid nitrogen for 2 freeze-thaw cycles lasting 5 seconds, located on the above locations.   The patient's consent was obtained including but not limited to risks of crusting, scabbing,  blistering, scarring, darker or lighter pigmentary change, recurrence, incomplete removal and infection.    Counseling.  Sun protective clothing and broad spectrum sunscreen can prevent the formation of AK.   AKs can be treated with cryotherapy, photodynamic therapy, imiquimod, topical 5-FU.  Actinic Keratoses are precancerous proliferations that occur within sun damaged skin. If untreated,  a small subset of AKs can develop into Squamous Cell Carcinoma.    Return to clinic in 1 year FSE     AVS printed with patient instructions     Josef Bonilla MD   Mohs Surgery/Dermatologic Oncology  Dermatology

## 2023-08-23 ENCOUNTER — OFFICE VISIT (OUTPATIENT)
Dept: FAMILY MEDICINE | Facility: CLINIC | Age: 73
End: 2023-08-23
Payer: MEDICARE

## 2023-08-23 VITALS
WEIGHT: 125 LBS | HEIGHT: 63 IN | SYSTOLIC BLOOD PRESSURE: 103 MMHG | BODY MASS INDEX: 22.15 KG/M2 | DIASTOLIC BLOOD PRESSURE: 60 MMHG | HEART RATE: 90 BPM

## 2023-08-23 DIAGNOSIS — Z12.11 SCREENING FOR MALIGNANT NEOPLASM OF COLON: Primary | ICD-10-CM

## 2023-08-23 DIAGNOSIS — Z12.31 BREAST CANCER SCREENING BY MAMMOGRAM: ICD-10-CM

## 2023-08-23 DIAGNOSIS — M79.672 LEFT FOOT PAIN: ICD-10-CM

## 2023-08-23 PROCEDURE — 99213 OFFICE O/P EST LOW 20 MIN: CPT | Mod: ,,, | Performed by: FAMILY MEDICINE

## 2023-08-23 PROCEDURE — 99213 PR OFFICE/OUTPT VISIT, EST, LEVL III, 20-29 MIN: ICD-10-PCS | Mod: ,,, | Performed by: FAMILY MEDICINE

## 2023-08-23 NOTE — PROGRESS NOTES
"              Star Galaviz IV, DO  [facility] [place]  [address], [place] [address]  Phone: [phone]      Subjective     Name: Casi Broussard   Sex: female  YOB: 1950 (73 y.o.)  MRN: [id number]  Visit Date: 08/23/2023   Chief Complaint: Foot Pain (Pt c/o left foot pain)        HISTORY OF PRESENT ILLNESS:    Chief Complaint   Patient presents with    Foot Pain     Pt c/o left foot pain       HPI  See tests that keep you healthy and the problem oriented documentation below.    Tests to Keep You Healthy    Mammogram: ORDERED BUT NOT SCHEDULED  Colon Cancer Screening: Met on 11/13/2020  Last Blood Pressure <= 139/89 (8/23/2023): Yes      Portions of this note may have been created with voice recognition software. Occasional "wrong-word" or "sound-a-like" substitutions may have occurred due to the inherent limitations of voice recognition software. Please, read the note carefully and recognize, using context, where substitutions have occurred.     PAST MEDICAL HISTORY:  Significant Diagnoses - Patient  has a past medical history of Anxiety, Depression, and Hypertension.  Medications - Patient has a current medication list which includes the following long-term medication(s): amlodipine, aspirin, carvedilol, citalopram, losartan-hydrochlorothiazide 100-12.5 mg, memantine, and fluocinonide.   Allergies - Patient is allergic to codeine.  Surgeries - Patient  has no past surgical history on file.  Family History - Patient family history is not on file.      SOCIAL HISTORY:  Tobacco - Patient  reports that she has never smoked. She has never been exposed to tobacco smoke. She has never used smokeless tobacco.   Alcohol - Patient  reports current alcohol use.   Recreational Drugs - Patient  has no history on file for drug use.       Review of Systems   All other systems reviewed and are negative.       Past Medical History:   Diagnosis Date    Anxiety     Depression     " "Hypertension         Review of patient's allergies indicates:   Allergen Reactions    Codeine         History reviewed. No pertinent surgical history.     History reviewed. No pertinent family history.    Current Outpatient Medications   Medication Instructions    amLODIPine (NORVASC) 5 mg, Oral, Daily    aspirin (ECOTRIN) 81 mg, Oral, Daily    carvediloL (COREG) 3.125 mg, Oral, 2 times daily    citalopram (CELEXA) 10 MG tablet Take 1 tablet by mouth once daily    fluocinonide (LIDEX) 0.05 % external solution Topical (Top), 2 times daily    losartan-hydrochlorothiazide 100-12.5 mg (HYZAAR) 100-12.5 mg Tab 1 tablet, Oral, Daily    memantine (NAMENDA) 5 mg, Oral, 2 times daily        Objective     /60   Pulse 90   Ht 5' 3" (1.6 m)   Wt 56.7 kg (125 lb)   BMI 22.14 kg/m²     Physical Exam  Constitutional:       General: She is not in acute distress.     Appearance: Normal appearance. She is not ill-appearing.   HENT:      Head: Normocephalic and atraumatic.      Right Ear: External ear normal.      Left Ear: External ear normal.   Eyes:      Extraocular Movements: Extraocular movements intact.      Conjunctiva/sclera: Conjunctivae normal.   Cardiovascular:      Rate and Rhythm: Normal rate.      Heart sounds: No murmur heard.  Pulmonary:      Effort: Pulmonary effort is normal.   Musculoskeletal:      Cervical back: Normal range of motion.   Skin:     General: Skin is warm and dry.      Coloration: Skin is not jaundiced.      Findings: No rash.   Neurological:      Mental Status: She is alert.   Psychiatric:         Mood and Affect: Mood normal.        All recently obtained labs have been reviewed and discussed in detail with the patient.   Assessment     1. Screening for malignant neoplasm of colon    2. Breast cancer screening by mammogram    3. Left foot pain         Plan        Problem List Items Addressed This Visit    None  Visit Diagnoses       Screening for malignant neoplasm of colon    -  " Primary    Relevant Orders    Cologuard Screening (Multitarget Stool DNA)    Breast cancer screening by mammogram        Relevant Orders    Mammo Digital Screening Bilat    Left foot pain        Relevant Orders    Ambulatory referral/consult to Podiatry            No follow-ups on file.    Patient advised that is symptoms worsen, they should call or report directly to local emergency department.    Star Galaviz,   [facility]

## 2023-08-24 LAB
ESTROGEN SERPL-MCNC: NORMAL PG/ML
INSULIN SERPL-ACNC: NORMAL U[IU]/ML
LAB AP GROSS DESCRIPTION: NORMAL
LAB AP LABORATORY NOTES: NORMAL
LAB AP SPEC A DDX: NORMAL
LAB AP SPEC A MORPHOLOGY: NORMAL
LAB AP SPEC A PROCEDURE: NORMAL
T3RU NFR SERPL: NORMAL %

## 2023-08-28 ENCOUNTER — TELEPHONE (OUTPATIENT)
Dept: FAMILY MEDICINE | Facility: CLINIC | Age: 73
End: 2023-08-28
Payer: MEDICARE

## 2023-08-28 DIAGNOSIS — M79.672 LEFT FOOT PAIN: Primary | ICD-10-CM

## 2023-08-28 PROCEDURE — 99499 NO LOS: ICD-10-PCS | Mod: ,,, | Performed by: FAMILY MEDICINE

## 2023-08-28 PROCEDURE — 99499 UNLISTED E&M SERVICE: CPT | Mod: ,,, | Performed by: FAMILY MEDICINE

## 2023-08-30 ENCOUNTER — TELEPHONE (OUTPATIENT)
Dept: FAMILY MEDICINE | Facility: CLINIC | Age: 73
End: 2023-08-30
Payer: COMMERCIAL

## 2023-08-30 NOTE — TELEPHONE ENCOUNTER
----- Message from Maya Hubbard MA sent at 8/25/2023  9:34 AM CDT -----  Regarding: Referral  Patient called requesting that her referral be changed from Podiatrist to Orthopedic. You can reach her at [phone].     Thanks!

## 2023-09-20 ENCOUNTER — OFFICE VISIT (OUTPATIENT)
Dept: FAMILY MEDICINE | Facility: CLINIC | Age: 73
End: 2023-09-20
Payer: MEDICARE

## 2023-09-20 VITALS
DIASTOLIC BLOOD PRESSURE: 70 MMHG | HEART RATE: 64 BPM | RESPIRATION RATE: 14 BRPM | HEIGHT: 63 IN | OXYGEN SATURATION: 98 % | BODY MASS INDEX: 22 KG/M2 | WEIGHT: 124.19 LBS | SYSTOLIC BLOOD PRESSURE: 117 MMHG

## 2023-09-20 DIAGNOSIS — Z78.0 POSTMENOPAUSAL STATE: ICD-10-CM

## 2023-09-20 DIAGNOSIS — M79.672 LEFT FOOT PAIN: ICD-10-CM

## 2023-09-20 DIAGNOSIS — Z13.6 SCREENING FOR ISCHEMIC HEART DISEASE: ICD-10-CM

## 2023-09-20 DIAGNOSIS — Z11.59 SCREENING FOR VIRAL DISEASE: ICD-10-CM

## 2023-09-20 DIAGNOSIS — Z00.00 ENCOUNTER FOR SUBSEQUENT ANNUAL WELLNESS VISIT (AWV) IN MEDICARE PATIENT: Primary | ICD-10-CM

## 2023-09-20 DIAGNOSIS — F03.90 DEMENTIA, UNSPECIFIED DEMENTIA SEVERITY, UNSPECIFIED DEMENTIA TYPE, UNSPECIFIED WHETHER BEHAVIORAL, PSYCHOTIC, OR MOOD DISTURBANCE OR ANXIETY: ICD-10-CM

## 2023-09-20 DIAGNOSIS — I10 ESSENTIAL HYPERTENSION: ICD-10-CM

## 2023-09-20 DIAGNOSIS — D69.2 SENILE PURPURA: ICD-10-CM

## 2023-09-20 PROCEDURE — 86803 HEPATITIS C AB TEST: CPT | Mod: ,,, | Performed by: CLINICAL MEDICAL LABORATORY

## 2023-09-20 PROCEDURE — 80061 LIPID PANEL: CPT | Mod: ,,, | Performed by: CLINICAL MEDICAL LABORATORY

## 2023-09-20 PROCEDURE — G0439 PR MEDICARE ANNUAL WELLNESS SUBSEQUENT VISIT: ICD-10-PCS | Mod: ,,, | Performed by: FAMILY MEDICINE

## 2023-09-20 PROCEDURE — 86803 HEPATITIS C ANTIBODY: ICD-10-PCS | Mod: ,,, | Performed by: CLINICAL MEDICAL LABORATORY

## 2023-09-20 PROCEDURE — 80061 LIPID PANEL: ICD-10-PCS | Mod: ,,, | Performed by: CLINICAL MEDICAL LABORATORY

## 2023-09-20 PROCEDURE — G0439 PPPS, SUBSEQ VISIT: HCPCS | Mod: ,,, | Performed by: FAMILY MEDICINE

## 2023-09-20 NOTE — ASSESSMENT & PLAN NOTE
Most recent TSH is within lab reference ranges.  Patient is not on any medications.  Likely can resolve this.

## 2023-09-20 NOTE — PATIENT INSTRUCTIONS
Counseling and Referral of Other Preventative  (Italic type indicates deductible and co-insurance are waived)    Patient Name: Casi Broussard  Today's Date: 9/20/2023    Health Maintenance       Date Due Completion Date    Hepatitis C Screening Never done ---    Lipid Panel Never done ---    COVID-19 Vaccine (1) Never done ---    TETANUS VACCINE Never done ---    DEXA Scan Never done ---    Shingles Vaccine (1 of 2) Never done ---    Mammogram 08/09/2019 8/9/2018    Influenza Vaccine (1) 09/01/2023 12/13/2022    Colorectal Cancer Screening 11/13/2023 11/13/2020        No orders of the defined types were placed in this encounter.    The following information is provided to all patients.  This information is to help you find resources for any of the problems found today that may be affecting your health:                Living healthy guide: www.[facility].[place].[facility]      Understanding Diabetes: www.diabetes.org      Eating healthy: www.cdc.gov/healthyweight      [facility] home safety checklist: www.cdc.gov/steadi/patient.html      Agency on Aging: www.goea.[place].[facility]      Alcoholics anonymous (AA): www.aa.org      Physical Activity: www.avery.nih.gov/jb9oibb      Tobacco use: www.quitwithusla.org

## 2023-09-20 NOTE — PROGRESS NOTES
"  Casi Broussard presented for a  Medicare AWV and comprehensive Health Risk Assessment today. The following components were reviewed and updated:    Medical history  Family History  Social history  Allergies and Current Medications  Health Risk Assessment  Health Maintenance  Care Team         ** See Completed Assessments for Annual Wellness Visit within the encounter summary.**         The following assessments were completed:  Living Situation  CAGE  Depression Screening  Timed Get Up and Go  Whisper Test  Cognitive Function Screening  Nutrition Screening  ADL Screening  PAQ Screening                Vitals:    09/20/23 1328   BP: 117/70   BP Location: Left arm   Patient Position: Sitting   Pulse: 64   Resp: 14   SpO2: 98%   Weight: 56.3 kg (124 lb 3.2 oz)   Height: 5' 3" (1.6 m)     Body mass index is 22 kg/m².  Physical Exam          Diagnoses and health risks identified today and associated recommendations/orders:    Problem List Items Addressed This Visit          Neuro    Dementia, unspecified dementia severity, unspecified dementia type, unspecified whether behavioral, psychotic, or mood disturbance or anxiety     patient has decreased her medication to p.o. q.d..            Cardiac/Vascular    Essential hypertension     Well-controlled in the office today at 117/70.  Patient is on losartan hydrochlorothiazide 100-12.5 p.o. q.d. as well as Norvasc 5 mg p.o. q.d..  She is also taking Coreg 3.125 mg p.o. b.i.d..  Continue current care at this time            Hematology    Senile purpura     Refer to images in chart.  Patient states this is resolving at this time.            Orthopedic    Left foot pain     Appointment with podiatrist Dr. Porras.  October 2, 2023.          Other Visit Diagnoses       Encounter for subsequent annual wellness visit (AWV) in Medicare patient    -  Primary    BMI 22.0-22.9, adult        Screening for ischemic heart disease        Relevant Orders    Lipid panel    Postmenopausal " state        Relevant Orders    DXA Bone Density Axial Skeleton 1 or more sites    Screening for viral disease        Relevant Orders    Hepatitis C Antibody            States last mammogram was at [facility], will request report  Declines influenza and shingles vaccines       Provided Casi with a 5-10 year written screening schedule and personal prevention plan. Recommendations were developed using the USPSTF age appropriate recommendations. Education, counseling, and referrals were provided as needed. After Visit Summary printed and given to patient which includes a list of additional screenings\tests needed.    Follow up for 1 year for Annual Wellness Visit.    FELISA ZUÑIGA RN      I offered to discuss advanced care planning, including how to pick a person who would make decisions for you if you were unable to make them for yourself, called a health care power of , and what kind of decisions you might make such as use of life sustaining treatments such as ventilators and tube feeding when faced with a life limiting illness recorded on a living will that they will need to know. (How you want to be cared for as you near the end of your natural life)     X Patient is interested in learning more about how to make advanced directives.  I provided them paperwork and offered to discuss this with them.

## 2023-09-20 NOTE — Clinical Note
Please request mammogram from [facility] in [facility], [place]

## 2023-09-20 NOTE — ASSESSMENT & PLAN NOTE
Well-controlled in the office today at 117/70.  Patient is on losartan hydrochlorothiazide 100-12.5 p.o. q.d. as well as Norvasc 5 mg p.o. q.d..  She is also taking Coreg 3.125 mg p.o. b.i.d..  Continue current care at this time

## 2023-09-21 LAB
CHOLEST SERPL-MCNC: 281 MG/DL (ref 0–200)
CHOLEST/HDLC SERPL: 4.8 {RATIO}
HCV AB SER QL: NORMAL
HDLC SERPL-MCNC: 58 MG/DL (ref 40–60)
LDLC SERPL CALC-MCNC: 167 MG/DL
LDLC/HDLC SERPL: 2.9 {RATIO}
NONHDLC SERPL-MCNC: 223 MG/DL
TRIGL SERPL-MCNC: 281 MG/DL (ref 35–150)
VLDLC SERPL-MCNC: 56 MG/DL

## 2023-09-29 DIAGNOSIS — E78.5 HYPERLIPIDEMIA, UNSPECIFIED HYPERLIPIDEMIA TYPE: Primary | ICD-10-CM

## 2023-09-29 NOTE — TELEPHONE ENCOUNTER
----- Message from Star Galaviz IV, DO sent at 9/25/2023  9:04 AM CDT -----  Please call patient with normal lab values.  Patient would benefit from lipid lowering medication.    ----- Message -----  From: Lab, Background User  Sent: 9/21/2023   5:42 PM CDT  To: Star Galaviz IV, DO

## 2023-10-02 RX ORDER — ATORVASTATIN CALCIUM 40 MG/1
40 TABLET, FILM COATED ORAL DAILY
Qty: 90 TABLET | Refills: 3 | Status: SHIPPED | OUTPATIENT
Start: 2023-10-02 | End: 2024-10-01

## 2023-10-08 DIAGNOSIS — I10 ESSENTIAL HYPERTENSION: ICD-10-CM

## 2023-10-16 RX ORDER — CITALOPRAM 10 MG/1
TABLET ORAL
Qty: 90 TABLET | Refills: 0 | Status: SHIPPED | OUTPATIENT
Start: 2023-10-16 | End: 2024-01-11

## 2023-11-01 ENCOUNTER — PATIENT OUTREACH (OUTPATIENT)
Dept: ADMINISTRATIVE | Facility: HOSPITAL | Age: 73
End: 2023-11-01

## 2023-11-01 NOTE — PROGRESS NOTES

## 2023-11-01 NOTE — LETTER
AUTHORIZATION FOR RELEASE OF   CONFIDENTIAL INFORMATION    Dear [facility],    We are seeing Casi Broussard, date of birth 1950, in the clinic at [facility]. Star Galaviz IV, DO is the patient's PCP. Casi Broussard has an outstanding lab/procedure at the time we reviewed her chart. In order to help keep her health information updated, she has authorized us to request the following medical record(s):        (XX  )  MAMMOGRAM                                      (  )  COLONOSCOPY      (  )  PAP SMEAR                                          (  )  OUTSIDE LAB RESULTS     (  )  DEXA SCAN                                          (  )  EYE EXAM            (  )  FOOT EXAM                                          (  )  ENTIRE RECORD     (  )  OUTSIDE IMMUNIZATIONS                 (  )  _______________         Please fax records to [facility], Brown, William L IV, DO, [phone]     If you have any questions, please contact [facility] at [phone].           Patient Name: Casi Broussard  : 1950  Patient Phone #: [phone]

## 2023-11-16 DIAGNOSIS — Z12.31 BREAST CANCER SCREENING BY MAMMOGRAM: Primary | ICD-10-CM

## 2023-12-05 DIAGNOSIS — I10 ESSENTIAL HYPERTENSION: ICD-10-CM

## 2023-12-05 RX ORDER — LOSARTAN POTASSIUM AND HYDROCHLOROTHIAZIDE 12.5; 1 MG/1; MG/1
1 TABLET ORAL DAILY
Qty: 90 TABLET | Refills: 0 | Status: SHIPPED | OUTPATIENT
Start: 2023-12-05 | End: 2024-02-20 | Stop reason: SDUPTHER

## 2023-12-05 RX ORDER — CARVEDILOL 3.12 MG/1
3.12 TABLET ORAL 2 TIMES DAILY
Qty: 180 TABLET | Refills: 0 | Status: SHIPPED | OUTPATIENT
Start: 2023-12-05 | End: 2024-02-20 | Stop reason: SDUPTHER

## 2024-01-11 DIAGNOSIS — I10 ESSENTIAL HYPERTENSION: ICD-10-CM

## 2024-01-11 RX ORDER — CITALOPRAM 10 MG/1
TABLET ORAL
Qty: 90 TABLET | Refills: 0 | Status: SHIPPED | OUTPATIENT
Start: 2024-01-11 | End: 2024-02-20 | Stop reason: SDUPTHER

## 2024-02-07 DIAGNOSIS — R41.3 MEMORY LOSS: ICD-10-CM

## 2024-02-07 DIAGNOSIS — L21.9 SEBORRHEIC DERMATITIS: ICD-10-CM

## 2024-02-07 DIAGNOSIS — I10 ESSENTIAL HYPERTENSION: ICD-10-CM

## 2024-02-07 RX ORDER — AMLODIPINE BESYLATE 5 MG/1
5 TABLET ORAL
Qty: 30 TABLET | Refills: 0 | Status: SHIPPED | OUTPATIENT
Start: 2024-02-07 | End: 2024-02-20 | Stop reason: SDUPTHER

## 2024-02-07 RX ORDER — MEMANTINE HYDROCHLORIDE 5 MG/1
5 TABLET ORAL 2 TIMES DAILY
Qty: 60 TABLET | Refills: 0 | Status: SHIPPED | OUTPATIENT
Start: 2024-02-07 | End: 2024-02-20

## 2024-02-08 LAB — NONINV COLON CA DNA+OCC BLD SCRN STL QL: NEGATIVE

## 2024-02-12 RX ORDER — FLUOCINONIDE TOPICAL SOLUTION USP, 0.05% 0.5 MG/ML
SOLUTION TOPICAL
Qty: 60 ML | Refills: 0 | Status: SHIPPED | OUTPATIENT
Start: 2024-02-12 | End: 2024-03-11

## 2024-02-20 ENCOUNTER — OFFICE VISIT (OUTPATIENT)
Dept: FAMILY MEDICINE | Facility: CLINIC | Age: 74
End: 2024-02-20
Payer: MEDICARE

## 2024-02-20 VITALS
SYSTOLIC BLOOD PRESSURE: 155 MMHG | HEART RATE: 87 BPM | DIASTOLIC BLOOD PRESSURE: 72 MMHG | BODY MASS INDEX: 22.15 KG/M2 | HEIGHT: 63 IN | WEIGHT: 125 LBS

## 2024-02-20 DIAGNOSIS — F03.90 DEMENTIA, UNSPECIFIED DEMENTIA SEVERITY, UNSPECIFIED DEMENTIA TYPE, UNSPECIFIED WHETHER BEHAVIORAL, PSYCHOTIC, OR MOOD DISTURBANCE OR ANXIETY: ICD-10-CM

## 2024-02-20 DIAGNOSIS — D69.2 SENILE PURPURA: ICD-10-CM

## 2024-02-20 DIAGNOSIS — F32.A DEPRESSION, UNSPECIFIED DEPRESSION TYPE: ICD-10-CM

## 2024-02-20 DIAGNOSIS — I10 ESSENTIAL HYPERTENSION: Primary | ICD-10-CM

## 2024-02-20 DIAGNOSIS — F41.9 ANXIETY: ICD-10-CM

## 2024-02-20 PROCEDURE — 1126F AMNT PAIN NOTED NONE PRSNT: CPT | Mod: ,,, | Performed by: FAMILY MEDICINE

## 2024-02-20 PROCEDURE — 3008F BODY MASS INDEX DOCD: CPT | Mod: ,,, | Performed by: FAMILY MEDICINE

## 2024-02-20 PROCEDURE — 3078F DIAST BP <80 MM HG: CPT | Mod: ,,, | Performed by: FAMILY MEDICINE

## 2024-02-20 PROCEDURE — 1101F PT FALLS ASSESS-DOCD LE1/YR: CPT | Mod: ,,, | Performed by: FAMILY MEDICINE

## 2024-02-20 PROCEDURE — 3288F FALL RISK ASSESSMENT DOCD: CPT | Mod: ,,, | Performed by: FAMILY MEDICINE

## 2024-02-20 PROCEDURE — 99214 OFFICE O/P EST MOD 30 MIN: CPT | Mod: ,,, | Performed by: FAMILY MEDICINE

## 2024-02-20 PROCEDURE — 3077F SYST BP >= 140 MM HG: CPT | Mod: ,,, | Performed by: FAMILY MEDICINE

## 2024-02-20 PROCEDURE — 1159F MED LIST DOCD IN RCRD: CPT | Mod: ,,, | Performed by: FAMILY MEDICINE

## 2024-02-20 RX ORDER — CARVEDILOL 3.12 MG/1
3.12 TABLET ORAL 2 TIMES DAILY
Qty: 180 TABLET | Refills: 3 | Status: SHIPPED | OUTPATIENT
Start: 2024-02-20 | End: 2025-02-19

## 2024-02-20 RX ORDER — AMLODIPINE BESYLATE 5 MG/1
5 TABLET ORAL DAILY
Qty: 90 TABLET | Refills: 3 | Status: SHIPPED | OUTPATIENT
Start: 2024-02-20 | End: 2025-02-19

## 2024-02-20 RX ORDER — CITALOPRAM 10 MG/1
10 TABLET ORAL DAILY
Qty: 90 TABLET | Refills: 3 | Status: SHIPPED | OUTPATIENT
Start: 2024-02-20 | End: 2025-02-19

## 2024-02-20 RX ORDER — LOSARTAN POTASSIUM AND HYDROCHLOROTHIAZIDE 12.5; 1 MG/1; MG/1
1 TABLET ORAL DAILY
Qty: 90 TABLET | Refills: 3 | Status: SHIPPED | OUTPATIENT
Start: 2024-02-20 | End: 2025-02-19

## 2024-02-21 PROBLEM — F32.A DEPRESSION: Status: ACTIVE | Noted: 2024-02-21

## 2024-02-21 PROBLEM — F41.9 ANXIETY: Status: ACTIVE | Noted: 2024-02-21

## 2024-02-21 PROBLEM — F41.9 ANXIETY: Chronic | Status: ACTIVE | Noted: 2024-02-21

## 2024-02-21 PROBLEM — F32.A DEPRESSION: Chronic | Status: ACTIVE | Noted: 2024-02-21

## 2024-02-21 PROBLEM — I10 ESSENTIAL HYPERTENSION: Chronic | Status: ACTIVE | Noted: 2022-12-13

## 2024-02-21 NOTE — PROGRESS NOTES
"              Star Galaviz IV, DO  [facility]  [address], [place] [address]  Phone: [phone]      Subjective     Name: Casi Broussard   Sex: female  YOB: 1950 (73 y.o.)  MRN: [id number]  Visit Date: 02/21/2024   Chief Complaint: Medication Refill        HISTORY OF PRESENT ILLNESS:    Chief Complaint   Patient presents with    Medication Refill       HPI  See tests that keep you healthy and the problem oriented documentation below.    Tests to Keep You Healthy    Mammogram: ORDERED BUT NOT SCHEDULED  Colon Cancer Screening: Met on 1/30/2024  Last Blood Pressure <= 139/89 (2/20/2024): NO      Portions of this note may have been created with voice recognition software. Occasional "wrong-word" or "sound-a-like" substitutions may have occurred due to the inherent limitations of voice recognition software. Please, read the note carefully and recognize, using context, where substitutions have occurred.     PAST MEDICAL HISTORY:  Significant Diagnoses - Patient  has a past medical history of Anxiety, Depression, and Hypertension.  Medications - Patient has a current medication list which includes the following long-term medication(s): aspirin, atorvastatin, amlodipine, carvedilol, citalopram, fluocinonide, and losartan-hydrochlorothiazide 100-12.5 mg.   Allergies - Patient is allergic to codeine.  Surgeries - Patient  has a past surgical history that includes Hysterectomy.  Family History - Patient family history includes Heart attack in her mother; Hypertension in her father.      SOCIAL HISTORY:  Tobacco - Patient  reports that she has never smoked. She has never been exposed to tobacco smoke. She has never used smokeless tobacco.   Alcohol - Patient  reports current alcohol use.   Recreational Drugs - Patient  reports no history of drug use.       Review of Systems   All other systems reviewed and are negative.       Past Medical History:   Diagnosis Date    Anxiety  " "   Depression     Hypertension         Review of patient's allergies indicates:   Allergen Reactions    Codeine         Past Surgical History:   Procedure Laterality Date    HYSTERECTOMY          Family History   Problem Relation Age of Onset    Heart attack Mother     Hypertension Father        Current Outpatient Medications   Medication Instructions    amLODIPine (NORVASC) 5 mg, Oral, Daily    aspirin (ECOTRIN) 81 mg, Oral, Daily    atorvastatin (LIPITOR) 40 mg, Oral, Daily    carvediloL (COREG) 3.125 mg, Oral, 2 times daily    citalopram (CELEXA) 10 mg, Oral, Daily    fluocinonide (LIDEX) 0.05 % external solution APPLY  SOLUTION TOPICALLY TWICE DAILY    losartan-hydrochlorothiazide 100-12.5 mg (HYZAAR) 100-12.5 mg Tab 1 tablet, Oral, Daily        Objective     BP (!) 155/72   Pulse 87   Ht 5' 3" (1.6 m)   Wt 56.7 kg (125 lb)   BMI 22.14 kg/m²     Physical Exam  Constitutional:       General: She is not in acute distress.     Appearance: Normal appearance. She is not ill-appearing.   HENT:      Head: Normocephalic and atraumatic.      Right Ear: External ear normal.      Left Ear: External ear normal.   Eyes:      Extraocular Movements: Extraocular movements intact.      Conjunctiva/sclera: Conjunctivae normal.   Cardiovascular:      Rate and Rhythm: Normal rate.      Heart sounds: No murmur heard.  Pulmonary:      Effort: Pulmonary effort is normal.   Musculoskeletal:      Cervical back: Normal range of motion.   Skin:     General: Skin is warm and dry.      Coloration: Skin is not jaundiced.      Findings: No rash.   Neurological:      Mental Status: She is alert.   Psychiatric:         Mood and Affect: Mood normal.        All recently obtained labs have been reviewed and discussed in detail with the patient.   Assessment     1. Essential hypertension    2. Depression, unspecified depression type    3. Anxiety    4. Dementia, unspecified dementia severity, unspecified dementia type, unspecified " whether behavioral, psychotic, or mood disturbance or anxiety    5. Senile purpura         Plan        Problem List Items Addressed This Visit          Neuro    Dementia, unspecified dementia severity, unspecified dementia type, unspecified whether behavioral, psychotic, or mood disturbance or anxiety       Psychiatric    Depression (Chronic)    Relevant Medications    citalopram (CELEXA) 10 MG tablet    Anxiety (Chronic)    Relevant Medications    citalopram (CELEXA) 10 MG tablet       Cardiac/Vascular    Essential hypertension - Primary (Chronic)    Relevant Medications    carvediloL (COREG) 3.125 MG tablet    losartan-hydrochlorothiazide 100-12.5 mg (HYZAAR) 100-12.5 mg Tab    amLODIPine (NORVASC) 5 MG tablet       Hematology    Senile purpura       No follow-ups on file.    Patient advised that is symptoms worsen, they should call or report directly to local emergency department.    Star Galaviz DO  [facility]

## 2024-03-08 DIAGNOSIS — L21.9 SEBORRHEIC DERMATITIS: ICD-10-CM

## 2024-03-09 DIAGNOSIS — Z71.89 COMPLEX CARE COORDINATION: ICD-10-CM

## 2024-03-11 RX ORDER — FLUOCINONIDE TOPICAL SOLUTION USP, 0.05% 0.5 MG/ML
SOLUTION TOPICAL
Qty: 60 ML | Refills: 0 | Status: SHIPPED | OUTPATIENT
Start: 2024-03-11

## 2024-05-14 ENCOUNTER — TELEPHONE (OUTPATIENT)
Dept: FAMILY MEDICINE | Facility: CLINIC | Age: 74
End: 2024-05-14
Payer: MEDICARE

## 2024-05-14 VITALS — SYSTOLIC BLOOD PRESSURE: 125 MMHG | DIASTOLIC BLOOD PRESSURE: 66 MMHG

## 2024-07-08 DIAGNOSIS — E78.5 HYPERLIPIDEMIA, UNSPECIFIED HYPERLIPIDEMIA TYPE: ICD-10-CM

## 2024-07-15 RX ORDER — ATORVASTATIN CALCIUM 40 MG/1
40 TABLET, FILM COATED ORAL
Qty: 90 TABLET | Refills: 0 | Status: SHIPPED | OUTPATIENT
Start: 2024-07-15

## 2024-08-22 ENCOUNTER — OFFICE VISIT (OUTPATIENT)
Dept: DERMATOLOGY | Facility: CLINIC | Age: 74
End: 2024-08-22
Payer: MEDICARE

## 2024-08-22 DIAGNOSIS — D18.01 CHERRY ANGIOMA: ICD-10-CM

## 2024-08-22 DIAGNOSIS — D22.9 MULTIPLE BENIGN NEVI: ICD-10-CM

## 2024-08-22 DIAGNOSIS — L57.0 ACTINIC KERATOSES: Primary | ICD-10-CM

## 2024-08-22 DIAGNOSIS — L82.1 SEBORRHEIC KERATOSES: ICD-10-CM

## 2024-08-22 DIAGNOSIS — D23.9 DERMATOFIBROMA: ICD-10-CM

## 2024-08-22 NOTE — PROGRESS NOTES
[facility]  Josef Bonilla MD    [address] [place] [address]  [phone]    Fax: [phone]    Patient Name: Casi Broussard  Medical Record Number: [id number]  PCP: Star Galaviz IV, DO  Age: 74 y.o. : 1950  Contact: [phone] (home)     History of Present Illness:     Casi Broussard is a 74 y.o.  female here for follow up of AK that was treated with LN2 and resolved. Today, she is c/o lesions of back and L shoulder.      The patient has no other concerns today.    Review of Systems:     Unremarkable other than mentioned above.     Physical Exam:     General: Relaxed, oriented, alert    Skin examination of the scalp, face, neck, chest, back, abdomen, upper extremities and lower extremities were normal except for as listed below      Assessment and Plan:     1. Seborrheic keratoses   - brown stuck on appearing papules/plaques  - patient educated on benign nature. No treatment necessary unless they become irritated or inflamed       2. Cherry Angiomas  - Scattered bright pink papules    Plan: Counseling  I counseled the patient regarding the diagnosis including that cherry angiomas are benign skin growths requiring no treatment. Patients get more as they age.    3. Dermatofibroma   - dome-shaped pink-tan nodule with positive dimple sign on the R arm.     Plan: Reassure  Dermatofibromas are benign. They should be surgically removed if symptomatic or if they grow.    4. Actinic Keratoses  Erythematous, scaly papules on L wrist, L leg     Plan: Liquid Nitrogen.  A total of 2 lesions were treated with liquid nitrogen for 2 freeze-thaw cycles lasting 5 seconds, located on the above locations.   The patient's consent was obtained including but not limited to risks of crusting, scabbing,  blistering, scarring, darker or lighter pigmentary change, recurrence, incomplete removal and infection.    Counseling.  Sun protective clothing and broad spectrum sunscreen  can prevent the formation of AK.   AKs can be treated with cryotherapy, photodynamic therapy, imiquimod, topical 5-FU.  Actinic Keratoses are precancerous proliferations that occur within sun damaged skin. If untreated,  a small subset of AKs can develop into Squamous Cell Carcinoma.    5. Benign appearing melanocytic nevi   - no lesions appear clinically or dermatoscopically atypical enough to warrant biopsy at this time  - The patient was counseled on the ABCDE's of melanoma and on the importance of performing monthly self skin checks at home in between visits and will call our clinic with changes.  - discussed importance of sunscreen SPF 30 or greater     Josef Bonilla MD   Mohs Surgery/Dermatologic Oncology  Dermatology

## 2024-10-09 DIAGNOSIS — Z71.89 COMPLEX CARE COORDINATION: ICD-10-CM

## 2024-11-20 NOTE — PROGRESS NOTES
"  Casi Broussard presented for a  Medicare AWV and comprehensive Health Risk Assessment today. The following components were reviewed and updated:    Medical history  Family History  Social history  Allergies and Current Medications  Health Risk Assessment  Health Maintenance  Care Team         ** See Completed Assessments for Annual Wellness Visit within the encounter summary.**         The following assessments were completed:  Living Situation  CAGE  Depression Screening  Timed Get Up and Go  Whisper Test  Cognitive Function Screening  Nutrition Screening  ADL Screening  PAQ Screening        Opioid Documentation    does not have a current opioid prescription.       Vitals:    11/21/24 1118   BP: 115/62   BP Location: Left arm   Patient Position: Sitting   Pulse: 70   Resp: 14   SpO2: 98%   Weight: 55.1 kg (121 lb 8 oz)   Height: 5' 3" (1.6 m)     Body mass index is 21.52 kg/m².  Physical Exam  Constitutional:       General: She is not in acute distress.     Appearance: She is not ill-appearing or toxic-appearing.   HENT:      Head: Normocephalic and atraumatic.      Nose: Nose normal.   Eyes:      General: No scleral icterus.     Extraocular Movements: Extraocular movements intact.   Pulmonary:      Effort: Pulmonary effort is normal.   Musculoskeletal:      Cervical back: No rigidity or tenderness.   Skin:     Findings: No rash.               Diagnoses and health risks identified today and associated recommendations/orders:    Problem List Items Addressed This Visit       Hypothyroidism         Recent Labs   Lab 12/13/22  1109   TSH 0.681              Essential hypertension (Chronic)         Vitals:    11/21/24 1118   BP: 115/62              Alzheimer's dementia     Currently on memantine 5 mg p.o. b.i.d..         Peripheral vascular disease, unspecified    Post-menopause    Relevant Orders    DXA Bone Density Axial Skeleton 1 or more sites     Other Visit Diagnoses       Encounter for subsequent annual " wellness visit (AWV) in Medicare patient    -  Primary    Need for vaccination        Relevant Medications    influenza (adjuvanted) (Fluad) 45 mcg/0.5 mL IM vaccine (> or = 66 yo) 0.5 mL (Completed)            Provided Casi with a 5-10 year written screening schedule and personal prevention plan. Recommendations were developed using the USPSTF age appropriate recommendations. Education, counseling, and referrals were provided as needed. After Visit Summary printed and given to patient which includes a list of additional screenings\tests needed.    Follow up for 1 year for Annual Wellness Visit.    Star Galaviz IV, DO  I offered to discuss advanced care planning, including how to pick a person who would make decisions for you if you were unable to make them for yourself, called a health care power of , and what kind of decisions you might make such as use of life sustaining treatments such as ventilators and tube feeding when faced with a life limiting illness recorded on a living will that they will need to know. (How you want to be cared for as you near the end of your natural life)     X Patient is interested in learning more about how to make advanced directives.  I provided them paperwork and offered to discuss this with them.

## 2024-11-21 ENCOUNTER — OFFICE VISIT (OUTPATIENT)
Dept: FAMILY MEDICINE | Facility: CLINIC | Age: 74
End: 2024-11-21
Payer: MEDICARE

## 2024-11-21 VITALS
WEIGHT: 121.5 LBS | BODY MASS INDEX: 21.53 KG/M2 | SYSTOLIC BLOOD PRESSURE: 115 MMHG | HEIGHT: 63 IN | OXYGEN SATURATION: 98 % | DIASTOLIC BLOOD PRESSURE: 62 MMHG | RESPIRATION RATE: 14 BRPM | HEART RATE: 70 BPM

## 2024-11-21 DIAGNOSIS — Z78.0 POST-MENOPAUSE: ICD-10-CM

## 2024-11-21 DIAGNOSIS — Z23 NEED FOR VACCINATION: ICD-10-CM

## 2024-11-21 DIAGNOSIS — I10 ESSENTIAL HYPERTENSION: Chronic | ICD-10-CM

## 2024-11-21 DIAGNOSIS — E03.9 HYPOTHYROIDISM, UNSPECIFIED TYPE: ICD-10-CM

## 2024-11-21 DIAGNOSIS — Z00.00 ENCOUNTER FOR SUBSEQUENT ANNUAL WELLNESS VISIT (AWV) IN MEDICARE PATIENT: Primary | ICD-10-CM

## 2024-11-21 DIAGNOSIS — I73.9 PERIPHERAL VASCULAR DISEASE, UNSPECIFIED: ICD-10-CM

## 2024-11-21 DIAGNOSIS — G30.9 ALZHEIMER'S DEMENTIA, UNSPECIFIED DEMENTIA SEVERITY, UNSPECIFIED TIMING OF DEMENTIA ONSET, UNSPECIFIED WHETHER BEHAVIORAL, PSYCHOTIC, OR MOOD DISTURBANCE OR ANXIETY: ICD-10-CM

## 2024-11-21 DIAGNOSIS — F02.80 ALZHEIMER'S DEMENTIA, UNSPECIFIED DEMENTIA SEVERITY, UNSPECIFIED TIMING OF DEMENTIA ONSET, UNSPECIFIED WHETHER BEHAVIORAL, PSYCHOTIC, OR MOOD DISTURBANCE OR ANXIETY: ICD-10-CM

## 2024-11-21 RX ORDER — MEMANTINE HYDROCHLORIDE 5 MG/1
5 TABLET ORAL 2 TIMES DAILY
Qty: 60 TABLET | Refills: 0 | Status: SHIPPED | OUTPATIENT
Start: 2024-11-21 | End: 2024-12-21

## 2024-11-21 RX ORDER — MEMANTINE HYDROCHLORIDE 5 MG/1
1 TABLET ORAL 2 TIMES DAILY
COMMUNITY
End: 2024-11-21 | Stop reason: SDUPTHER

## 2024-11-21 RX ORDER — MULTIVITAMIN
1 TABLET ORAL DAILY
COMMUNITY

## 2024-11-21 NOTE — PATIENT INSTRUCTIONS
Counseling and Referral of Other Preventative  (Italic type indicates deductible and co-insurance are waived)    Patient Name: Casi Broussard  Today's Date: 11/21/2024    Health Maintenance       Date Due Completion Date    TETANUS VACCINE Never done ---    DEXA Scan Never done ---    Shingles Vaccine (1 of 2) Never done ---    RSV Vaccine (Age 60+ and Pregnant patients) (1 - 1-dose 75+ series) Never done ---    Influenza Vaccine (1) 09/01/2024 9/20/2023 (Declined)    Override on 9/20/2023: Declined    COVID-19 Vaccine (1 - 2024-25 season) Never done ---    Colorectal Cancer Screening 01/30/2027 1/30/2024    Lipid Panel 08/27/2029 8/27/2024        No orders of the defined types were placed in this encounter.    The following information is provided to all patients.  This information is to help you find resources for any of the problems found today that may be affecting your health:                  Living healthy guide: [place].gov    Understanding Diabetes: www.diabetes.org      Eating healthy: www.cdc.gov/healthyweight      CDC home safety checklist: www.cdc.gov/steadi/patient.html      Agency on Aging: [place].gov    Alcoholics anonymous (AA): www.aa.org      Physical Activity: www.avery.nih.gov/te7yzgl      Tobacco use: [place].gov

## 2024-12-02 DIAGNOSIS — E78.5 HYPERLIPIDEMIA, UNSPECIFIED HYPERLIPIDEMIA TYPE: ICD-10-CM

## 2024-12-02 DIAGNOSIS — F32.A DEPRESSION, UNSPECIFIED DEPRESSION TYPE: ICD-10-CM

## 2024-12-02 DIAGNOSIS — F41.9 ANXIETY: ICD-10-CM

## 2024-12-02 DIAGNOSIS — I10 ESSENTIAL HYPERTENSION: ICD-10-CM

## 2024-12-02 RX ORDER — CARVEDILOL 3.12 MG/1
3.12 TABLET ORAL 2 TIMES DAILY
Qty: 180 TABLET | Refills: 0 | Status: SHIPPED | OUTPATIENT
Start: 2024-12-02 | End: 2025-03-02

## 2024-12-02 RX ORDER — LOSARTAN POTASSIUM AND HYDROCHLOROTHIAZIDE 12.5; 1 MG/1; MG/1
1 TABLET ORAL DAILY
Qty: 90 TABLET | Refills: 0 | Status: SHIPPED | OUTPATIENT
Start: 2024-12-02 | End: 2025-03-02

## 2024-12-02 RX ORDER — ATORVASTATIN CALCIUM 40 MG/1
40 TABLET, FILM COATED ORAL DAILY
Qty: 90 TABLET | Refills: 3 | Status: SHIPPED | OUTPATIENT
Start: 2024-12-02 | End: 2025-12-02

## 2024-12-02 RX ORDER — MEMANTINE HYDROCHLORIDE 5 MG/1
5 TABLET ORAL 2 TIMES DAILY
Qty: 180 TABLET | Refills: 0 | Status: SHIPPED | OUTPATIENT
Start: 2024-12-02 | End: 2025-03-02

## 2024-12-02 RX ORDER — AMLODIPINE BESYLATE 5 MG/1
5 TABLET ORAL DAILY
Qty: 90 TABLET | Refills: 0 | Status: SHIPPED | OUTPATIENT
Start: 2024-12-02 | End: 2025-03-02

## 2025-02-04 ENCOUNTER — OFFICE VISIT (OUTPATIENT)
Dept: FAMILY MEDICINE | Facility: CLINIC | Age: 75
End: 2025-02-04
Payer: MEDICARE

## 2025-02-04 VITALS
DIASTOLIC BLOOD PRESSURE: 66 MMHG | HEART RATE: 78 BPM | SYSTOLIC BLOOD PRESSURE: 110 MMHG | WEIGHT: 122 LBS | BODY MASS INDEX: 21.62 KG/M2 | HEIGHT: 63 IN

## 2025-02-04 DIAGNOSIS — F32.A DEPRESSION, UNSPECIFIED DEPRESSION TYPE: ICD-10-CM

## 2025-02-04 DIAGNOSIS — F41.9 ANXIETY: ICD-10-CM

## 2025-02-04 DIAGNOSIS — E78.5 HYPERLIPIDEMIA, UNSPECIFIED HYPERLIPIDEMIA TYPE: ICD-10-CM

## 2025-02-04 DIAGNOSIS — I10 ESSENTIAL HYPERTENSION: ICD-10-CM

## 2025-02-04 DIAGNOSIS — G30.9 ALZHEIMER'S DEMENTIA, UNSPECIFIED DEMENTIA SEVERITY, UNSPECIFIED TIMING OF DEMENTIA ONSET, UNSPECIFIED WHETHER BEHAVIORAL, PSYCHOTIC, OR MOOD DISTURBANCE OR ANXIETY: ICD-10-CM

## 2025-02-04 DIAGNOSIS — F02.80 ALZHEIMER'S DEMENTIA, UNSPECIFIED DEMENTIA SEVERITY, UNSPECIFIED TIMING OF DEMENTIA ONSET, UNSPECIFIED WHETHER BEHAVIORAL, PSYCHOTIC, OR MOOD DISTURBANCE OR ANXIETY: ICD-10-CM

## 2025-02-04 DIAGNOSIS — R41.3 MEMORY LOSS: Primary | ICD-10-CM

## 2025-02-04 PROCEDURE — 3074F SYST BP LT 130 MM HG: CPT | Mod: ,,, | Performed by: FAMILY MEDICINE

## 2025-02-04 PROCEDURE — 3288F FALL RISK ASSESSMENT DOCD: CPT | Mod: ,,, | Performed by: FAMILY MEDICINE

## 2025-02-04 PROCEDURE — 3078F DIAST BP <80 MM HG: CPT | Mod: ,,, | Performed by: FAMILY MEDICINE

## 2025-02-04 PROCEDURE — 1159F MED LIST DOCD IN RCRD: CPT | Mod: ,,, | Performed by: FAMILY MEDICINE

## 2025-02-04 PROCEDURE — 1101F PT FALLS ASSESS-DOCD LE1/YR: CPT | Mod: ,,, | Performed by: FAMILY MEDICINE

## 2025-02-04 PROCEDURE — 1126F AMNT PAIN NOTED NONE PRSNT: CPT | Mod: ,,, | Performed by: FAMILY MEDICINE

## 2025-02-04 PROCEDURE — 99214 OFFICE O/P EST MOD 30 MIN: CPT | Mod: ,,, | Performed by: FAMILY MEDICINE

## 2025-02-04 RX ORDER — LOSARTAN POTASSIUM AND HYDROCHLOROTHIAZIDE 12.5; 1 MG/1; MG/1
1 TABLET ORAL DAILY
Qty: 90 TABLET | Refills: 3 | Status: SHIPPED | OUTPATIENT
Start: 2025-02-04 | End: 2026-02-04

## 2025-02-04 RX ORDER — CITALOPRAM 10 MG/1
10 TABLET ORAL DAILY
Qty: 90 TABLET | Refills: 3 | Status: SHIPPED | OUTPATIENT
Start: 2025-02-04 | End: 2026-02-04

## 2025-02-04 RX ORDER — MEMANTINE HYDROCHLORIDE 5 MG/1
5 TABLET ORAL 2 TIMES DAILY
Qty: 180 TABLET | Refills: 3 | Status: SHIPPED | OUTPATIENT
Start: 2025-02-04 | End: 2026-02-04

## 2025-02-04 RX ORDER — AMLODIPINE BESYLATE 5 MG/1
5 TABLET ORAL DAILY
Qty: 90 TABLET | Refills: 3 | Status: SHIPPED | OUTPATIENT
Start: 2025-02-04 | End: 2026-02-04

## 2025-02-04 RX ORDER — CARVEDILOL 3.12 MG/1
3.12 TABLET ORAL 2 TIMES DAILY
Qty: 180 TABLET | Refills: 3 | Status: SHIPPED | OUTPATIENT
Start: 2025-02-04 | End: 2026-02-04

## 2025-02-04 NOTE — PROGRESS NOTES
"              Star Galaviz IV, DO  [facility] [place]  [address], [place] [address]  Phone: [phone]      Subjective     Name: Casi Broussard   Sex: female  YOB: 1950 (74 y.o.)  MRN: [id number]  Visit Date: 2/4/25   Chief Complaint: Medication Refill        HISTORY OF PRESENT ILLNESS:    Chief Complaint   Patient presents with    Medication Refill       HPI        Review of Systems   All other systems reviewed and are negative.        SOCIAL HISTORY:  Tobacco - Patient  reports that she has never smoked. She has never been exposed to tobacco smoke. She has never used smokeless tobacco.   Alcohol - Patient  reports that she does not currently use alcohol.   Recreational Drugs - Patient  reports no history of drug use.         See tests that keep you healthy and the problem oriented documentation below.    All of your core healthy metrics are met.      Portions of this note may have been created with voice recognition software. Occasional "wrong-word" or "sound-a-like" substitutions may have occurred due to the inherent limitations of voice recognition software. Please, read the note carefully and recognize, using context, where substitutions have occurred.          Past Medical History:   Diagnosis Date    Anxiety     Depression     Hypertension         Review of patient's allergies indicates:   Allergen Reactions    Codeine         Past Surgical History:   Procedure Laterality Date    HYSTERECTOMY          Family History   Problem Relation Name Age of Onset    Heart attack Mother      Hypertension Father         Current Outpatient Medications   Medication Instructions    amLODIPine (NORVASC) 5 mg, Oral, Daily    aspirin (ECOTRIN) 81 mg, Daily    atorvastatin (LIPITOR) 40 mg, Oral, Daily    carvediloL (COREG) 3.125 mg, Oral, 2 times daily    citalopram (CELEXA) 10 mg, Oral, Daily    losartan-hydrochlorothiazide 100-12.5 mg (HYZAAR) 100-12.5 mg Tab 1 tablet, " "Oral, Daily    memantine (NAMENDA) 5 mg, Oral, 2 times daily    multivitamin (THERAGRAN) per tablet 1 tablet, Daily        Objective     /66   Pulse 78   Ht 5' 3" (1.6 m)   Wt 55.3 kg (122 lb)   BMI 21.61 kg/m²     Physical Exam  Constitutional:       General: She is not in acute distress.     Appearance: Normal appearance. She is not ill-appearing.   HENT:      Head: Normocephalic and atraumatic.      Right Ear: External ear normal.      Left Ear: External ear normal.   Eyes:      Extraocular Movements: Extraocular movements intact.      Conjunctiva/sclera: Conjunctivae normal.   Cardiovascular:      Rate and Rhythm: Normal rate.      Heart sounds: No murmur heard.  Pulmonary:      Effort: Pulmonary effort is normal.   Musculoskeletal:      Cervical back: Normal range of motion.   Skin:     General: Skin is warm and dry.      Coloration: Skin is not jaundiced.      Findings: No rash.   Neurological:      Mental Status: She is alert.   Psychiatric:         Mood and Affect: Mood normal.        All recently obtained labs have been reviewed and discussed in detail with the patient.   Assessment     1. Memory loss    2. Hyperlipidemia, unspecified hyperlipidemia type    3. Essential hypertension    4. Depression, unspecified depression type    5. Anxiety    6. Alzheimer's dementia, unspecified dementia severity, unspecified timing of dementia onset, unspecified whether behavioral, psychotic, or mood disturbance or anxiety         Plan        Problem List Items Addressed This Visit       Essential hypertension (Chronic)    Relevant Medications    carvediloL (COREG) 3.125 MG tablet    losartan-hydrochlorothiazide 100-12.5 mg (HYZAAR) 100-12.5 mg Tab    amLODIPine (NORVASC) 5 MG tablet    Memory loss - Primary    Relevant Medications    memantine (NAMENDA) 5 MG Tab    Alzheimer's dementia    Depression (Chronic)    Relevant Medications    citalopram (CELEXA) 10 MG tablet    Anxiety (Chronic)    Relevant " Medications    citalopram (CELEXA) 10 MG tablet     Other Visit Diagnoses       Hyperlipidemia, unspecified hyperlipidemia type                No follow-ups on file.    Patient advised that is symptoms worsen, they should call or report directly to local emergency department.    Star Galaviz DO  [facility]

## 2025-08-25 ENCOUNTER — TELEPHONE (OUTPATIENT)
Dept: DERMATOLOGY | Facility: CLINIC | Age: 75
End: 2025-08-25
Payer: MEDICARE